# Patient Record
Sex: FEMALE | Race: WHITE | Employment: OTHER
[De-identification: names, ages, dates, MRNs, and addresses within clinical notes are randomized per-mention and may not be internally consistent; named-entity substitution may affect disease eponyms.]

---

## 2017-01-09 ENCOUNTER — HOSPITAL ENCOUNTER (OUTPATIENT)
Dept: PHYSICAL THERAPY | Age: 82
Setting detail: THERAPIES SERIES
Discharge: STILL ADMITTED OR ADMINISTRATIVE | End: 2017-01-09
Attending: PHYSICAL THERAPIST

## 2017-01-09 DIAGNOSIS — R29.3 POSTURAL INSTABILITY: ICD-10-CM

## 2017-01-09 DIAGNOSIS — R26.89 UNSTABLE BALANCE: ICD-10-CM

## 2017-01-09 DIAGNOSIS — R29.898 WEAKNESS OF BOTH LOWER EXTREMITIES: ICD-10-CM

## 2017-01-20 ENCOUNTER — HOSPITAL ENCOUNTER (OUTPATIENT)
Dept: PHYSICAL THERAPY | Age: 82
Setting detail: THERAPIES SERIES
Discharge: STILL ADMITTED OR ADMINISTRATIVE | End: 2017-01-20
Attending: PHYSICAL THERAPIST

## 2017-01-20 DIAGNOSIS — R29.3 POSTURAL INSTABILITY: ICD-10-CM

## 2017-01-20 DIAGNOSIS — R29.898 WEAKNESS OF BOTH LOWER EXTREMITIES: ICD-10-CM

## 2017-01-24 ENCOUNTER — HOSPITAL ENCOUNTER (OUTPATIENT)
Dept: PHYSICAL THERAPY | Age: 82
Setting detail: THERAPIES SERIES
Discharge: STILL ADMITTED OR ADMINISTRATIVE | End: 2017-01-24
Attending: PHYSICAL THERAPIST

## 2017-01-24 DIAGNOSIS — R29.898 WEAKNESS OF BOTH LOWER EXTREMITIES: ICD-10-CM

## 2017-01-24 DIAGNOSIS — R29.3 POSTURAL INSTABILITY: ICD-10-CM

## 2017-01-24 DIAGNOSIS — R26.89 UNSTABLE BALANCE: ICD-10-CM

## 2017-01-26 ENCOUNTER — HOSPITAL ENCOUNTER (OUTPATIENT)
Dept: PHYSICAL THERAPY | Age: 82
Setting detail: THERAPIES SERIES
Discharge: STILL ADMITTED OR ADMINISTRATIVE | End: 2017-01-26
Attending: PHYSICAL THERAPIST

## 2017-01-26 DIAGNOSIS — R26.89 UNSTABLE BALANCE: ICD-10-CM

## 2017-01-26 DIAGNOSIS — R29.3 POSTURAL INSTABILITY: ICD-10-CM

## 2017-01-26 DIAGNOSIS — R29.898 WEAKNESS OF BOTH LOWER EXTREMITIES: ICD-10-CM

## 2017-01-30 ENCOUNTER — HOSPITAL ENCOUNTER (OUTPATIENT)
Dept: PHYSICAL THERAPY | Age: 82
Setting detail: THERAPIES SERIES
Discharge: STILL ADMITTED OR ADMINISTRATIVE | End: 2017-01-30
Attending: PHYSICAL THERAPIST

## 2017-01-30 DIAGNOSIS — R29.3 POSTURAL INSTABILITY: ICD-10-CM

## 2017-01-30 DIAGNOSIS — R26.89 UNSTABLE BALANCE: ICD-10-CM

## 2017-01-30 DIAGNOSIS — R29.898 WEAKNESS OF BOTH LOWER EXTREMITIES: ICD-10-CM

## 2017-02-02 ENCOUNTER — HOSPITAL ENCOUNTER (OUTPATIENT)
Dept: PHYSICAL THERAPY | Age: 82
Setting detail: THERAPIES SERIES
Discharge: STILL ADMITTED OR ADMINISTRATIVE | End: 2017-02-02
Attending: PHYSICAL THERAPIST

## 2017-02-02 DIAGNOSIS — R26.89 UNSTABLE BALANCE: ICD-10-CM

## 2017-02-02 DIAGNOSIS — R29.898 WEAKNESS OF BOTH LOWER EXTREMITIES: ICD-10-CM

## 2017-02-02 DIAGNOSIS — R29.3 POSTURAL INSTABILITY: ICD-10-CM

## 2017-02-06 ENCOUNTER — HOSPITAL ENCOUNTER (OUTPATIENT)
Dept: PHYSICAL THERAPY | Age: 82
Setting detail: THERAPIES SERIES
Discharge: STILL ADMITTED OR ADMINISTRATIVE | End: 2017-02-06
Attending: PHYSICAL THERAPIST

## 2017-02-06 DIAGNOSIS — R29.3 POSTURAL INSTABILITY: ICD-10-CM

## 2017-02-06 DIAGNOSIS — R29.898 WEAKNESS OF BOTH LOWER EXTREMITIES: ICD-10-CM

## 2017-02-06 DIAGNOSIS — R26.89 UNSTABLE BALANCE: ICD-10-CM

## 2017-02-09 ENCOUNTER — HOSPITAL ENCOUNTER (OUTPATIENT)
Dept: PHYSICAL THERAPY | Age: 82
Setting detail: THERAPIES SERIES
Discharge: STILL ADMITTED OR ADMINISTRATIVE | End: 2017-02-09
Attending: PHYSICAL THERAPIST

## 2017-02-09 DIAGNOSIS — R29.898 WEAKNESS OF BOTH LOWER EXTREMITIES: ICD-10-CM

## 2017-02-09 DIAGNOSIS — R26.89 UNSTABLE BALANCE: ICD-10-CM

## 2017-02-09 DIAGNOSIS — R29.3 POSTURAL INSTABILITY: ICD-10-CM

## 2017-02-14 ENCOUNTER — HOSPITAL ENCOUNTER (OUTPATIENT)
Dept: PHYSICAL THERAPY | Age: 82
Setting detail: THERAPIES SERIES
Discharge: STILL ADMITTED OR ADMINISTRATIVE | End: 2017-02-14
Attending: PHYSICAL THERAPIST

## 2017-02-14 DIAGNOSIS — R29.3 POSTURAL INSTABILITY: ICD-10-CM

## 2017-02-14 DIAGNOSIS — R26.89 UNSTABLE BALANCE: ICD-10-CM

## 2017-02-14 DIAGNOSIS — R29.898 WEAKNESS OF BOTH LOWER EXTREMITIES: ICD-10-CM

## 2017-02-16 ENCOUNTER — HOSPITAL ENCOUNTER (OUTPATIENT)
Dept: PHYSICAL THERAPY | Age: 82
Setting detail: THERAPIES SERIES
Discharge: STILL ADMITTED OR ADMINISTRATIVE | End: 2017-02-16
Attending: PHYSICAL THERAPIST

## 2017-02-16 DIAGNOSIS — R26.89 UNSTABLE BALANCE: ICD-10-CM

## 2017-02-16 DIAGNOSIS — R29.898 WEAKNESS OF BOTH LOWER EXTREMITIES: ICD-10-CM

## 2017-02-16 DIAGNOSIS — R29.3 POSTURAL INSTABILITY: ICD-10-CM

## 2017-02-21 ENCOUNTER — HOSPITAL ENCOUNTER (OUTPATIENT)
Dept: CT IMAGING | Facility: CLINIC | Age: 82
Discharge: HOME | End: 2017-02-21
Attending: PREVENTIVE MEDICINE | Admitting: PREVENTIVE MEDICINE
Payer: MEDICARE

## 2017-02-21 DIAGNOSIS — Z00.6 RESEARCH EXAM: ICD-10-CM

## 2017-02-21 PROCEDURE — 71250 CT THORAX DX C-: CPT

## 2017-04-13 ENCOUNTER — OFFICE VISIT (OUTPATIENT)
Dept: NEUROLOGY | Facility: CLINIC | Age: 82
End: 2017-04-13

## 2017-04-13 VITALS — SYSTOLIC BLOOD PRESSURE: 110 MMHG | HEART RATE: 77 BPM | DIASTOLIC BLOOD PRESSURE: 64 MMHG | RESPIRATION RATE: 18 BRPM

## 2017-04-13 DIAGNOSIS — I95.9 HYPOTENSION, UNSPECIFIED HYPOTENSION TYPE: ICD-10-CM

## 2017-04-13 DIAGNOSIS — G20.A1 PARALYSIS AGITANS (H): Primary | ICD-10-CM

## 2017-04-13 ASSESSMENT — PAIN SCALES - GENERAL: PAINLEVEL: NO PAIN (0)

## 2017-04-13 NOTE — PROGRESS NOTES
"2017      Jn Carvalho MD   [facility]    [address] [place] [address]      RE: Olivia Dupont   MRN: [id number]   : 1929      Dear Shakeel:      I saw Olivia Dupont back.  She is accompanied by her daughter.  She is a patient I saw in December.  She has findings of idiopathic Parkinson disease.  She chose to go on no treatment.      She tells me she is feeling \"fine.\"  Generally, the right upper extremity tremor does not bother her except occasionally at night when she is trying to initiate sleep.  She feels a bit unsteady when she is walking but has had no falls.  She does report a brief sense of unsteadiness when she first gets up.      Examination when the nurse saw her revealed her blood pressure is 150/67 sitting and 110/64 when she stood.  I did recheck her orthostatic pressures and her systolic was 120 sitting and 104 when she stood.  She was not symptomatic with this.      The patient does have a reduced blink rate.  She has full extraocular motility.  She has a fairly persistent right upper extremity tremor.  I do not appreciate a tremor on the left.  She has increased tone and cogwheeling in the right arm and mild cogwheeling in the left arm.  She can get up out of a chair on her own.  She ambulates with a narrow base but a fairly good stride.  She turns in 2 steps and maintains her balance.  She has a reduction of her right arm swing with an associated tremor when she is ambulating.      IMPRESSION:   1.  Parkinson disease.   2.  Orthostatic blood pressure drops.      PLAN:  I again discussed a trial of Sinemet.  She declines.  She is concerned about side effects and it is possible it could exacerbate her orthostatic hypotension.      I did point out to the patient and her daughter that her blood pressure does drop when she stands and cautioned her to get up slowly.  She is on a diuretic and I have suggested following up with you to see if any " adjustment in her medications would be appropriate in view of the orthostatic blood pressure drop.      I have asked her to followup with me in 6 months.      Sincerely,      MD ANDI Noriega MD             D: 2017 09:21   T: 2017 14:33   MT: AKA      Name:     PEGGY FELTON   MRN:      [id number]        Account:      [id number]   :      1929           Service Date: 2017      Document: [id number]

## 2017-04-13 NOTE — LETTER
"2017       RE: Olivia Duopnt  [address] [facility] [address]     Dear Colleague,    Thank you for referring your patient, Olivia Dupont, to the [facility] at [facility]. Please see a copy of my visit note below.    2017      Jn Carvalho MD   [facility]    [address] [place] [address]      RE: Olivia Dupont   MRN: [id number]   : 1929      Dear Shakeel:      I saw Olivia Dupont back.  She is accompanied by her daughter.  She is a patient I saw in December.  She has findings of idiopathic Parkinson disease.  She chose to go on no treatment.      She tells me she is feeling \"fine.\"  Generally, the right upper extremity tremor does not bother her except occasionally at night when she is trying to initiate sleep.  She feels a bit unsteady when she is walking but has had no falls.  She does report a brief sense of unsteadiness when she first gets up.      Examination when the nurse saw her revealed her blood pressure is 150/67 sitting and 110/64 when she stood.  I did recheck her orthostatic pressures and her systolic was 120 sitting and 104 when she stood.  She was not symptomatic with this.      The patient does have a reduced blink rate.  She has full extraocular motility.  She has a fairly persistent right upper extremity tremor.  I do not appreciate a tremor on the left.  She has increased tone and cogwheeling in the right arm and mild cogwheeling in the left arm.  She can get up out of a chair on her own.  She ambulates with a narrow base but a fairly good stride.  She turns in 2 steps and maintains her balance.  She has a reduction of her right arm swing with an associated tremor when she is ambulating.      IMPRESSION:   1.  Parkinson disease.   2.  Orthostatic blood pressure drops.      PLAN:  I again discussed a trial of Sinemet.  She declines.  She " is concerned about side effects and it is possible it could exacerbate her orthostatic hypotension.      I did point out to the patient and her daughter that her blood pressure does drop when she stands and cautioned her to get up slowly.  She is on a diuretic and I have suggested following up with you to see if any adjustment in her medications would be appropriate in view of the orthostatic blood pressure drop.      I have asked her to followup with me in 6 months.      Sincerely,      Natanael Lobato MD                 D: 2017 09:21   T: 2017 14:33   MT: AKA      Name:     PEGGY FELTON   MRN:      [id number]        Account:      [id number]   :      1929           Service Date: 2017      Document: [id number]

## 2017-04-13 NOTE — MR AVS SNAPSHOT
After Visit Summary   2017    Olivia Dupont    MRN: [id number]           Patient Information     Date Of Birth          1929        Visit Information        Provider Department      2017 9:00 AM Natanael Lobato MD [facility]        Today's Diagnoses     Paralysis agitans (H)    -  1    Hypotension, unspecified hypotension type           Follow-ups after your visit        Follow-up notes from your care team     Discussed this visit Return in about 6 months (around 10/13/2017).      Who to contact     Please call your clinic at [phone] to:    Ask questions about your health    Make or cancel appointments    Discuss your medicines    Learn about your test results    Speak to your doctor   If you have compliments or concerns about an experience at your clinic, or if you wish to file a complaint, please contact [facility] Patient Relations at [phone] or email us at [email or web address]         Additional Information About Your Visit        MyChart Information     PowerPractical is an electronic gateway that provides easy, online access to your medical records. With PowerPractical, you can request a clinic appointment, read your test results, renew a prescription or communicate with your care team.     To sign up for HemoSheart visit the website at [email or web address]   You will be asked to enter the access code listed below, as well as some personal information. Please follow the directions to create your username and password.     Your access code is: [email or web address]  Expires: 2017  9:14 AM     Your access code will  in 90 days. If you need help or a new code, please contact your [facility] or call [phone] for assistance.        Care EveryWhere ID     This is your Care EveryWhere ID. This could be used by other organizations to access your [facility]  records  [id number]        Your Vitals Were     Pulse Respirations                77 18           Blood Pressure from Last 3 Encounters:   04/13/17 110/64   12/06/16 118/70    Weight from Last 3 Encounters:   No data found for Wt              Today, you had the following     No orders found for display       Primary Care Provider Office Phone # Fax #    Jn Carvalho [phone]       [facility] [address] [facility] [address]        Thank you!     Thank you for choosing [facility] NEUROLOGY CLINIC  for your care. Our goal is always to provide you with excellent care. Hearing back from our patients is one way we can continue to improve our services. Please take a few minutes to complete the written survey that you may receive in the mail after your visit with us. Thank you!             Your Updated Medication List - Protect others around you: Learn how to safely use, store and throw away your medicines at www.disposemymeds.org.          This list is accurate as of: 4/13/17  9:14 AM.  Always use your most recent med list.                   Brand Name Dispense Instructions for use    aspirin 325 MG tablet      Take 325 mg by mouth daily       atorvastatin 10 MG tablet    LIPITOR     Take 10 mg by mouth daily       calcium carbonate 600 MG tablet   Generic drug:  calcium carbonate      Take 600 mg by mouth daily       cod liver oil Caps capsule      Take 1 capsule by mouth daily       famotidine 10 MG tablet    PEPCID     Take 10 mg by mouth daily       triamterene-hydrochlorothiazide 37.5-25 MG per capsule    DYAZIDE     Take 1 capsule by mouth every morning

## 2017-09-08 ENCOUNTER — AMBULATORY - HEALTHEAST (OUTPATIENT)
Dept: NEUROLOGY | Facility: CLINIC | Age: 82
End: 2017-09-08

## 2017-09-08 DIAGNOSIS — G20.A1 PARKINSON'S DISEASE (H): ICD-10-CM

## 2017-09-11 ENCOUNTER — HOSPITAL ENCOUNTER (OUTPATIENT)
Dept: PHYSICAL THERAPY | Age: 82
Setting detail: THERAPIES SERIES
Discharge: STILL ADMITTED OR ADMINISTRATIVE | End: 2017-09-11
Attending: PHYSICAL THERAPIST

## 2017-09-11 ENCOUNTER — MEDICAL CORRESPONDENCE (OUTPATIENT)
Dept: HEALTH INFORMATION MANAGEMENT | Facility: CLINIC | Age: 82
End: 2017-09-11

## 2017-09-11 DIAGNOSIS — R29.3 POSTURAL INSTABILITY: ICD-10-CM

## 2017-09-11 DIAGNOSIS — R26.89 UNSTABLE BALANCE: ICD-10-CM

## 2017-09-11 DIAGNOSIS — R29.898 WEAKNESS OF BOTH LOWER EXTREMITIES: ICD-10-CM

## 2017-09-12 ENCOUNTER — CARE COORDINATION (OUTPATIENT)
Dept: NEUROLOGY | Facility: CLINIC | Age: 82
End: 2017-09-12

## 2017-09-12 NOTE — PROGRESS NOTES
Medication question  Call patient Received: Today       Jim Marquez, Janine Omalley RN       Phone Number: [phone]                     Patient called to say that Dr Lobato put her on Carbidopa-Levodopa and is wondering if she can still take her vitamins and  Aspirin. She can be reached today after 3pm at [phone]. Okay to leave a message      9/12/17:  Returned a call to [facility].  I do not see that Dr. Lobato has prescribed Carbidopa-Levodopa for the patient.  I did ask that she call us back to discuss.  I did mention that if she is taking carbidopa/levo that taking vitamins and or aspirin would be just fine.  I left our contact number for her to call us back.

## 2017-09-15 ENCOUNTER — CARE COORDINATION (OUTPATIENT)
Dept: NEUROLOGY | Facility: CLINIC | Age: 82
End: 2017-09-15

## 2017-09-15 NOTE — PROGRESS NOTES
Marika Lassiter, Marika Griffin, RN        Phone Number: [phone]                     9/13/17: left voicemail message for patient asking for a call back            Previous Messages       ----- Message -----      From: Natanael Lobato MD      Sent: 9/13/2017  10:49 AM        To: Marika Lassiter RN   Subject: RE: Medication question                           She could but the last time I saw her in April she decided not to take Sinemet and I did not prescribe it. Could you clarify with patient? Thanks [facility]   ----- Message -----      From: Marika Lassiter RN      Sent: 9/13/2017  10:37 AM        To: Natanael Lobato MD   Subject: FW: Medication question                           Patient can still take her vitamins and ASA while on sinemet... Correct?     ThanksChloe

## 2017-10-02 ENCOUNTER — HOSPITAL ENCOUNTER (OUTPATIENT)
Dept: PHYSICAL THERAPY | Age: 82
Setting detail: THERAPIES SERIES
Discharge: STILL ADMITTED OR ADMINISTRATIVE | End: 2017-10-02
Attending: PHYSICAL THERAPIST

## 2017-10-02 DIAGNOSIS — R29.898 WEAKNESS OF BOTH LOWER EXTREMITIES: ICD-10-CM

## 2017-10-02 DIAGNOSIS — R26.89 UNSTABLE BALANCE: ICD-10-CM

## 2017-10-02 DIAGNOSIS — R29.3 POSTURAL INSTABILITY: ICD-10-CM

## 2017-10-05 ENCOUNTER — HOSPITAL ENCOUNTER (OUTPATIENT)
Dept: PHYSICAL THERAPY | Age: 82
Setting detail: THERAPIES SERIES
Discharge: STILL ADMITTED OR ADMINISTRATIVE | End: 2017-10-05
Attending: PHYSICAL THERAPIST

## 2017-10-05 DIAGNOSIS — R26.89 UNSTABLE BALANCE: ICD-10-CM

## 2017-10-05 DIAGNOSIS — R29.898 WEAKNESS OF BOTH LOWER EXTREMITIES: ICD-10-CM

## 2017-10-05 DIAGNOSIS — R29.3 POSTURAL INSTABILITY: ICD-10-CM

## 2017-10-09 ENCOUNTER — HOSPITAL ENCOUNTER (OUTPATIENT)
Dept: PHYSICAL THERAPY | Age: 82
Setting detail: THERAPIES SERIES
Discharge: STILL ADMITTED OR ADMINISTRATIVE | End: 2017-10-09
Attending: PHYSICAL THERAPIST

## 2017-10-09 DIAGNOSIS — R26.89 UNSTABLE BALANCE: ICD-10-CM

## 2017-10-09 DIAGNOSIS — R29.898 WEAKNESS OF BOTH LOWER EXTREMITIES: ICD-10-CM

## 2017-10-09 DIAGNOSIS — R29.3 POSTURAL INSTABILITY: ICD-10-CM

## 2017-10-11 ENCOUNTER — HOSPITAL ENCOUNTER (OUTPATIENT)
Dept: PHYSICAL THERAPY | Age: 82
Setting detail: THERAPIES SERIES
Discharge: STILL ADMITTED OR ADMINISTRATIVE | End: 2017-10-11
Attending: PHYSICAL THERAPIST

## 2017-10-11 DIAGNOSIS — R29.3 POSTURAL INSTABILITY: ICD-10-CM

## 2017-10-11 DIAGNOSIS — R29.898 WEAKNESS OF BOTH LOWER EXTREMITIES: ICD-10-CM

## 2017-10-16 ENCOUNTER — HOSPITAL ENCOUNTER (OUTPATIENT)
Dept: PHYSICAL THERAPY | Age: 82
Setting detail: THERAPIES SERIES
Discharge: STILL ADMITTED OR ADMINISTRATIVE | End: 2017-10-16
Attending: PHYSICAL THERAPIST

## 2017-10-16 DIAGNOSIS — R29.3 POSTURAL INSTABILITY: ICD-10-CM

## 2017-10-16 DIAGNOSIS — R29.898 WEAKNESS OF BOTH LOWER EXTREMITIES: ICD-10-CM

## 2017-10-16 DIAGNOSIS — R26.89 UNSTABLE BALANCE: ICD-10-CM

## 2017-10-20 ENCOUNTER — HOSPITAL ENCOUNTER (OUTPATIENT)
Dept: PHYSICAL THERAPY | Age: 82
Setting detail: THERAPIES SERIES
Discharge: STILL ADMITTED OR ADMINISTRATIVE | End: 2017-10-20
Attending: PHYSICAL THERAPIST

## 2017-10-20 DIAGNOSIS — R26.89 UNSTABLE BALANCE: ICD-10-CM

## 2017-10-20 DIAGNOSIS — R29.3 POSTURAL INSTABILITY: ICD-10-CM

## 2017-10-20 DIAGNOSIS — R29.898 WEAKNESS OF BOTH LOWER EXTREMITIES: ICD-10-CM

## 2017-10-23 ENCOUNTER — HOSPITAL ENCOUNTER (OUTPATIENT)
Dept: PHYSICAL THERAPY | Age: 82
Setting detail: THERAPIES SERIES
Discharge: STILL ADMITTED OR ADMINISTRATIVE | End: 2017-10-23
Attending: PHYSICAL THERAPIST

## 2017-10-23 DIAGNOSIS — R29.898 WEAKNESS OF BOTH LOWER EXTREMITIES: ICD-10-CM

## 2017-10-23 DIAGNOSIS — R29.3 POSTURAL INSTABILITY: ICD-10-CM

## 2017-10-25 ENCOUNTER — HOSPITAL ENCOUNTER (OUTPATIENT)
Dept: PHYSICAL THERAPY | Age: 82
Setting detail: THERAPIES SERIES
Discharge: STILL ADMITTED OR ADMINISTRATIVE | End: 2017-10-25
Attending: PHYSICAL THERAPIST

## 2017-10-25 DIAGNOSIS — R29.3 POSTURAL INSTABILITY: ICD-10-CM

## 2017-10-30 ENCOUNTER — HOSPITAL ENCOUNTER (OUTPATIENT)
Dept: PHYSICAL THERAPY | Age: 82
Setting detail: THERAPIES SERIES
Discharge: STILL ADMITTED OR ADMINISTRATIVE | End: 2017-10-30
Attending: PHYSICAL THERAPIST

## 2017-10-30 DIAGNOSIS — R29.898 WEAKNESS OF BOTH LOWER EXTREMITIES: ICD-10-CM

## 2017-10-30 DIAGNOSIS — R26.89 UNSTABLE BALANCE: ICD-10-CM

## 2017-10-30 DIAGNOSIS — R29.3 POSTURAL INSTABILITY: ICD-10-CM

## 2017-11-03 ENCOUNTER — HOSPITAL ENCOUNTER (OUTPATIENT)
Dept: PHYSICAL THERAPY | Age: 82
Setting detail: THERAPIES SERIES
Discharge: STILL ADMITTED OR ADMINISTRATIVE | End: 2017-11-03
Attending: PHYSICAL THERAPIST

## 2017-11-03 DIAGNOSIS — R29.3 POSTURAL INSTABILITY: ICD-10-CM

## 2017-11-03 DIAGNOSIS — R29.898 WEAKNESS OF BOTH LOWER EXTREMITIES: ICD-10-CM

## 2017-11-03 DIAGNOSIS — R26.89 UNSTABLE BALANCE: ICD-10-CM

## 2017-11-13 ENCOUNTER — OFFICE VISIT (OUTPATIENT)
Dept: NEUROLOGY | Facility: CLINIC | Age: 82
End: 2017-11-13

## 2017-11-13 VITALS
DIASTOLIC BLOOD PRESSURE: 50 MMHG | WEIGHT: 153.3 LBS | BODY MASS INDEX: 27.16 KG/M2 | OXYGEN SATURATION: 97 % | SYSTOLIC BLOOD PRESSURE: 122 MMHG | RESPIRATION RATE: 18 BRPM | HEIGHT: 63 IN | HEART RATE: 74 BPM

## 2017-11-13 DIAGNOSIS — G43.109 MIGRAINE EQUIVALENT SYNDROME: ICD-10-CM

## 2017-11-13 DIAGNOSIS — G20.A1 PARALYSIS AGITANS (H): Primary | ICD-10-CM

## 2017-11-13 RX ORDER — CARBIDOPA/LEVODOPA 10MG-100MG
0.5 TABLET ORAL 3 TIMES DAILY
COMMUNITY
End: 2018-02-12

## 2017-11-13 ASSESSMENT — PAIN SCALES - GENERAL: PAINLEVEL: MILD PAIN (3)

## 2017-11-13 NOTE — LETTER
2017        RE: Olivia Dupont  [address] [facility] [address]     Dear Colleague,    Thank you for referring your patient, Olivia Dupont, to the [facility] NEUROLOGY at [facility]. Please see a copy of my visit note below.    2017      Jn Carvalho MD   [facility]    [address] [address]      RE: Olivia Dupont   MRN: [id number]   : 1929      Dear Shakeel:      I saw Olivia Dupont back.  She is a patient with Parkinson disease.      Since I saw her in April, she met with you and decided to try Sinemet.  She is currently taking 1/2 of a 10/100 Sinemet tablet 3 times a day.  Her daughter thinks it has helped her tremor, although the patient is less clear.  Apparently she developed a rash when she initially started on the drug, but that has since cleared.      She also reports an episode a month ago when she was at a [employer] service.  She developed wiggly, wavy lines in her visual field bilaterally.  She had trouble understanding the text she was reading, but she was fully alert.  She was not weak.  She did not get a headache.  This all lasted 5-10 minutes.  She does tell me she has had about a dozen migraine type headaches in her life and those characteristically were accompanied by a visual disturbance like she had with the event a month ago.      Examination reveals she is alert and cooperative.  Heart rate 91.  Blood pressure 136/79 sitting and 132/65 when she stands.      Cranial nerves II-XII are intact.  Visual fields are intact.  There is no focal motor or sensory deficit.  Reflexes are symmetric.  Plantar responses are flexor.      She has an intermittent but fairly persistent tremor at rest of the right upper extremity.  She has cogwheeling in the right arm.  She has a reduction of her right arm swing when she ambulates.  She turns in 2 steps.      She does have swelling of her  ankles.      IMPRESSION:   1.  Parkinson disease.   2.  Probable migraine event 1 month ago.      PLAN:  She is concerned about edema that has developed coincident with going on Sinemet.  I told her that I think this would be an unusual side effect.  We discussed the pros and cons of continuing the Sinemet.  Actually if we were going to do so, I would likely recommend trying to increase the dose.      She decided that she would just like to come off the Sinemet for now.  She is going to taper off over the next 10 days and I gave her a schedule to do so.  She could always resume it in the future.      I discussed the episode she had a month ago.  I suspect it was migrainous in nature, but told her if she has recurrent episodes such as this she would deserve further evaluation.  I told her if she has any episodes with acute changes in her level of consciousness, strength, speech, etc., that this would necessitate emergency evaluation.      I discussed followup with me.  I would be happy to see her on an as-needed basis.  She would actually prefer to see me back in 3 months and so this was scheduled.      Sincerely,       Natanael Lobato MD                    D: 2017 14:41   T: 2017 15:17   MT: AKA      Name:     PEGGY FELTON   MRN:      [id number]        Account:      [id number]   :      1929           Service Date: 2017      Document: [id number]

## 2017-11-13 NOTE — MR AVS SNAPSHOT
After Visit Summary   11/13/2017    Olivia Dupont    MRN: [id number]           Patient Information     Date Of Birth          1/31/1929        Visit Information        Provider Department      11/13/2017 2:00 PM Natanael oLbato MD [facility]        Today's Diagnoses     Paralysis agitans (H)    -  1    Migraine equivalent syndrome          Care Instructions    OK to try off Sinemet. Reduce to 1/2 tablet twice a day for 5 days, then 1/2 tablet a day for 5 days then may STOP          Follow-ups after your visit        Follow-up notes from your care team     Discussed this visit Return in about 3 months (around 2/13/2018), or if symptoms worsen or fail to improve.      Your next 10 appointments already scheduled     Feb 05, 2018  2:00 PM CST   (Arrive by 1:45 PM)   Return Visit with Natanael Lobato MD   [facility] ([facility])    [address] [place] [id number] [phone]              Who to contact     Please call your clinic at [phone] to:    Ask questions about your health    Make or cancel appointments    Discuss your medicines    Learn about your test results    Speak to your doctor   If you have compliments or concerns about an experience at your clinic, or if you wish to file a complaint, please contact [facility] Patient Relations at [phone] or email us at [email or web address]         Additional Information About Your Visit        MyChart Information     EndoChoicet is an electronic gateway that provides easy, online access to your medical records. With fluid Operations, you can request a clinic appointment, read your test results, renew a prescription or communicate with your care team.     To sign up for EndoChoicet visit the website at [email or web address]   You will be asked to enter the access code listed below, as well as some personal information. Please follow the directions  "to create your username and password.     Your access code is: [email or web address]  Expires: 2018  5:30 AM     Your access code will  in 90 days. If you need help or a new code, please contact your [facility] or call [phone] for assistance.        Care EveryWhere ID     This is your Care EveryWhere ID. This could be used by other organizations to access your [place] medical records  [id number]        Your Vitals Were     Pulse Respirations Height Pulse Oximetry BMI (Body Mass Index)       74 18 1.6 m (5' 3\") 97% 27.16 kg/m2        Blood Pressure from Last 3 Encounters:   17 122/50   17 110/64   16 118/70    Weight from Last 3 Encounters:   17 69.5 kg (153 lb 4.8 oz)              Today, you had the following     No orders found for display       Primary Care Provider Office Phone # Fax #    Jn Carvalho [phone]       [address] [address]        Equal Access to Services     [facility]: Hadii aad ku hadasho Soomaali, waaxda luqadaha, qaybta kaalmada adeegyada, waxay idiin hayaan meera craft . So [facility] [phone].    ATENCIÓN: Si habla español, tiene a camilo disposición servicios gratuitos de asistencia lingüística. Llame al [phone].    We comply with applicable federal civil rights laws and [place] laws. We do not discriminate on the basis of race, color, national origin, age, disability, sex, sexual orientation, or gender identity.            Thank you!     Thank you for choosing [facility] NEUROLOGY  for your care. Our goal is always to provide you with excellent care. Hearing back from our patients is one way we can continue to improve our services. Please take a few minutes to complete the written survey that you may receive in the mail after your visit with us. Thank you!             Your Updated Medication List - Protect others around you: Learn " how to safely use, store and throw away your medicines at www.disposemymeds.org.          This list is accurate as of: 11/13/17  2:37 PM.  Always use your most recent med list.                   Brand Name Dispense Instructions for use Diagnosis    aspirin 325 MG tablet      Take 325 mg by mouth daily        calcium carbonate 600 MG tablet   Generic drug:  calcium carbonate      Take 600 mg by mouth daily        carbidopa-levodopa  MG per tablet    SINEMET     Take 0.5 tablets by mouth 3 times daily        cod liver oil Caps capsule      Take 1 capsule by mouth daily        famotidine 10 MG tablet    PEPCID     Take 10 mg by mouth daily        LASIX PO      Take by mouth daily

## 2017-11-13 NOTE — PROGRESS NOTES
2017      Jn Carvalho MD   [facility]    [address] [place] [address]      RE: Olivia Dupont   MRN: [id number]   : 1929      Dear Shakeel:      I saw Olivia Dupont back.  She is a patient with Parkinson disease.      Since I saw her in April, she met with you and decided to try Sinemet.  She is currently taking 1/2 of a 10/100 Sinemet tablet 3 times a day.  Her daughter thinks it has helped her tremor, although the patient is less clear.  Apparently she developed a rash when she initially started on the drug, but that has since cleared.      She also reports an episode a month ago when she was at a [employer] service.  She developed wiggly, wavy lines in her visual field bilaterally.  She had trouble understanding the text she was reading, but she was fully alert.  She was not weak.  She did not get a headache.  This all lasted 5-10 minutes.  She does tell me she has had about a dozen migraine type headaches in her life and those characteristically were accompanied by a visual disturbance like she had with the event a month ago.      Examination reveals she is alert and cooperative.  Heart rate 91.  Blood pressure 136/79 sitting and 132/65 when she stands.      Cranial nerves II-XII are intact.  Visual fields are intact.  There is no focal motor or sensory deficit.  Reflexes are symmetric.  Plantar responses are flexor.      She has an intermittent but fairly persistent tremor at rest of the right upper extremity.  She has cogwheeling in the right arm.  She has a reduction of her right arm swing when she ambulates.  She turns in 2 steps.      She does have swelling of her ankles.      IMPRESSION:   1.  Parkinson disease.   2.  Probable migraine event 1 month ago.      PLAN:  She is concerned about edema that has developed coincident with going on Sinemet.  I told her that I think this would be an unusual side effect.  We discussed the pros and cons of  continuing the Sinemet.  Actually if we were going to do so, I would likely recommend trying to increase the dose.      She decided that she would just like to come off the Sinemet for now.  She is going to taper off over the next 10 days and I gave her a schedule to do so.  She could always resume it in the future.      I discussed the episode she had a month ago.  I suspect it was migrainous in nature, but told her if she has recurrent episodes such as this she would deserve further evaluation.  I told her if she has any episodes with acute changes in her level of consciousness, strength, speech, etc., that this would necessitate emergency evaluation.      I discussed followup with me.  I would be happy to see her on an as-needed basis.  She would actually prefer to see me back in 3 months and so this was scheduled.      Sincerely,       MD ANDI Noriega MD             D: 2017 14:41   T: 2017 15:17   MT: AKA      Name:     PEGGY FELTON   MRN:      [id number]        Account:      [id number]   :      1929           Service Date: 2017      Document: [id number]

## 2017-11-13 NOTE — PATIENT INSTRUCTIONS
OK to try off Sinemet. Reduce to 1/2 tablet twice a day for 5 days, then 1/2 tablet a day for 5 days then may STOP

## 2018-02-12 ENCOUNTER — OFFICE VISIT (OUTPATIENT)
Dept: NEUROLOGY | Facility: CLINIC | Age: 83
End: 2018-02-12
Payer: COMMERCIAL

## 2018-02-12 VITALS
DIASTOLIC BLOOD PRESSURE: 81 MMHG | SYSTOLIC BLOOD PRESSURE: 166 MMHG | HEART RATE: 75 BPM | HEIGHT: 63 IN | WEIGHT: 148 LBS | BODY MASS INDEX: 26.22 KG/M2

## 2018-02-12 DIAGNOSIS — G20.A1 PARALYSIS AGITANS (H): Primary | ICD-10-CM

## 2018-02-12 DIAGNOSIS — H53.2 DIPLOPIA: ICD-10-CM

## 2018-02-12 RX ORDER — TRIAMTERENE AND HYDROCHLOROTHIAZIDE 37.5; 25 MG/1; MG/1
1 CAPSULE ORAL
COMMUNITY
Start: 2017-11-22 | End: 2018-12-31

## 2018-02-12 ASSESSMENT — PAIN SCALES - GENERAL: PAINLEVEL: NO PAIN (0)

## 2018-02-12 NOTE — MR AVS SNAPSHOT
"              After Visit Summary   2018    Olivia Dupont    MRN: [id number]           Patient Information     Date Of Birth          1929        Visit Information        Provider Department      2018 9:00 AM Natanael Lobato MD [facility]        Today's Diagnoses     Paralysis agitans (H)    -  1    Diplopia           Follow-ups after your visit        Follow-up notes from your care team     Discussed this visit Return if symptoms worsen or fail to improve.      Who to contact     Please call your clinic at [phone] to:    Ask questions about your health    Make or cancel appointments    Discuss your medicines    Learn about your test results    Speak to your doctor            Additional Information About Your Visit        MyChart Information     [employer] is an electronic gateway that provides easy, online access to your medical records. With [employer]-light[employer], you can request a clinic appointment, read your test results, renew a prescription or communicate with your care team.     To sign up for [employer] visit the website at [email or web address]   You will be asked to enter the access code listed below, as well as some personal information. Please follow the directions to create your username and password.     Your access code is: [email or web address]  Expires: 2018  6:30 AM     Your access code will  in 90 days. If you need help or a new code, please contact your [facility] or call [phone] for assistance.        Care EveryWhere ID     This is your Care EveryWhere ID. This could be used by other organizations to access your [place] medical records  [id number]        Your Vitals Were     Pulse Height BMI (Body Mass Index)             75 1.6 m (5' 3\") 26.22 kg/m2          Blood Pressure from Last 3 Encounters:   18 166/81   17 122/50   17 110/64    Weight from Last 3 Encounters:   18 67.1 kg (148 lb)   17 69.5 kg (153 " lb 4.8 oz)              Today, you had the following     No orders found for display         Today's Medication Changes          These changes are accurate as of 2/12/18  9:26 AM.  If you have any questions, ask your nurse or doctor.               Stop taking these medicines if you haven't already. Please contact your care team if you have questions.     LASIX PO   Stopped by:  Natanael Lobato MD                    Primary Care Provider Office Phone # Fax #    Jn Carvalho [phone]       [facility] [address] [place] [address]        Equal Access to Services     [facility]: Hadii aad ku hadasho Soomaali, waaxda luqadaha, qaybta kaalmada adeegyada, zechariah craft . So [facility] [phone].    ATENCIÓN: Si habla español, tiene a camilo disposición servicios gratuitos de asistencia lingüística. [facility] [phone].    We comply with applicable federal civil rights laws and [place] laws. We do not discriminate on the basis of race, color, national origin, age, disability, sex, sexual orientation, or gender identity.            Thank you!     Thank you for choosing [facility] NEUROLOGY  for your care. Our goal is always to provide you with excellent care. Hearing back from our patients is one way we can continue to improve our services. Please take a few minutes to complete the written survey that you may receive in the mail after your visit with us. Thank you!             Your Updated Medication List - Protect others around you: Learn how to safely use, store and throw away your medicines at www.disposemymeds.org.          This list is accurate as of 2/12/18  9:26 AM.  Always use your most recent med list.                   Brand Name Dispense Instructions for use Diagnosis    aspirin 325 MG tablet      Take 325 mg by mouth daily        calcium carbonate 600 MG tablet   Generic drug:  calcium carbonate      Take 600 mg by mouth  daily        cod liver oil Caps capsule      Take 1 capsule by mouth daily        famotidine 10 MG tablet    PEPCID     Take 10 mg by mouth daily        triamterene-hydrochlorothiazide 37.5-25 MG per capsule    DYAZIDE     Take 1 capsule by mouth

## 2018-02-12 NOTE — PROGRESS NOTES
Service Date: 2018      Jn Carvalho MD   [facility]   [address] [place] [address]       RE: Olivia Dupont   MRN: [id number]   : 1929      Dear Shakeel:      I saw Olivia Dupont back.  She is accompanied by her daughter.      As you know, she has Parkinson disease.  When I saw her in November she decided to come off the low dose Sinemet she was taking (1/2 of a 10/100 tablet 3 times a day).  She was concerned because she developed edema on the drug.  She also may have developed a rash when she first started on it, but it cleared.      She feels she is doing about the same.  She does not want go back on the Sinemet.      When I saw her in 2017, she reported what I felt likely represented a migrainous visual disturbance.  This has not recurred.      Today, she reports she is having more frequent diplopia.  This is a problem we had not previously discussed.  She has been experiencing this for many years.  She may have in fact had prism lenses at one point, by her description.  She only notices double vision with extreme lateral gaze to the right or the left.  It is a horizontal separation.  She does not report any fatiguing ptosis or limb weakness.  She has no bulbar symptoms.  I should note that she did have a brain MRI scan done in 2016 and it showed some mild small vessel changes and atrophy but otherwise was unremarkable.      Examination reveals she is alert and cooperative.  She has slightly hypophonic speech today.  Heart rate 75.  Blood pressure initially was 166/81 but when it was rechecked it was 147/66.      She has a narrowing of her left palpebral fissure but no fatiguing ptosis.  She has a slight left esotropia, but full extraocular motility.  Her facial strength and limb strength are normal.  She has persistent rest tremor of the right upper extremity.  She has bilateral upper extremity cogwheeling.  She rocks once to get up  out of a chair, but can do so independently.  She ambulates with a narrow base with a reduction of her right arm swing and a slight tremor of the right hand.  She turns cautiously in 3 steps.      IMPRESSION:   1.  Parkinson disease which appears stable.   2.  Chronic diplopia.      PLAN:  As noted above, she does not want to retry Sinemet.      I discussed her diplopia.  I offered her referral to the Neuro-Ophthalmology for further evaluation, but she declined.  She will consider this if worsens.      At this juncture, followup with me will be as needed.      Sincerely,       MD ANDI Noriega MD             D: 2018   T: 2018   MT: AKA      Name:     PEGGY FELTON   MRN:      [id number]        Account:      [id number]   :      1929           Service Date: 2018      Document: [id number]

## 2018-02-12 NOTE — LETTER
2018       RE: Olivia Dupont  [address] [facility] [address] [address]     Dear Colleague,    Thank you for referring your patient, Olviia Dupont, to the [facility] NEUROLOGY at [facility]. Please see a copy of my visit note below.    Service Date: 2018      Jn Carvalho MD   [facility]   [address] [place] [address]       RE: Olivia Dupont   MRN: [id number]   : 1929      Dear Shakeel:      I saw Olivia Dupont back.  She is accompanied by her daughter.      As you know, she has Parkinson disease.  When I saw her in November she decided to come off the low dose Sinemet she was taking (1/2 of a 10/100 tablet 3 times a day).  She was concerned because she developed edema on the drug.  She also may have developed a rash when she first started on it, but it cleared.      She feels she is doing about the same.  She does not want go back on the Sinemet.      When I saw her in 2017, she reported what I felt likely represented a migrainous visual disturbance.  This has not recurred.      Today, she reports she is having more frequent diplopia.  This is a problem we had not previously discussed.  She has been experiencing this for many years.  She may have in fact had prism lenses at one point, by her description.  She only notices double vision with extreme lateral gaze to the right or the left.  It is a horizontal separation.  She does not report any fatiguing ptosis or limb weakness.  She has no bulbar symptoms.  I should note that she did have a brain MRI scan done in 2016 and it showed some mild small vessel changes and atrophy but otherwise was unremarkable.      Examination reveals she is alert and cooperative.  She has slightly hypophonic speech today.  Heart rate 75.  Blood pressure initially was 166/81 but when it was rechecked it was 147/66.      She has a narrowing of her left palpebral  fissure but no fatiguing ptosis.  She has a slight left esotropia, but full extraocular motility.  Her facial strength and limb strength are normal.  She has persistent rest tremor of the right upper extremity.  She has bilateral upper extremity cogwheeling.  She rocks once to get up out of a chair, but can do so independently.  She ambulates with a narrow base with a reduction of her right arm swing and a slight tremor of the right hand.  She turns cautiously in 3 steps.      IMPRESSION:   1.  Parkinson disease which appears stable.   2.  Chronic diplopia.      PLAN:  As noted above, she does not want to retry Sinemet.      I discussed her diplopia.  I offered her referral to the Neuro-Ophthalmology for further evaluation, but she declined.  She will consider this if worsens.      At this juncture, followup with me will be as needed.      Sincerely,       Natanael Lobato MD        D: 2018   T: 2018   MT: AKA   Name:     PEGGY FELTON   MRN:      [id number]        Account:      [id number]   :      1929           Service Date: 2018   Document: [id number]

## 2018-04-13 ENCOUNTER — DOCUMENTATION ONLY (OUTPATIENT)
Dept: NEUROLOGY | Facility: CLINIC | Age: 83
End: 2018-04-13

## 2018-04-13 NOTE — PROGRESS NOTES
Parkinson's Disease - Physician Questionnaire Cancer Prevention Study - Parkinson's, was completed and signed by Dr. Lobato. Mailed out today. Copy/upload sent to [employer].

## 2018-08-31 ENCOUNTER — CARE COORDINATION (OUTPATIENT)
Dept: NEUROLOGY | Facility: CLINIC | Age: 83
End: 2018-08-31

## 2018-08-31 NOTE — PROGRESS NOTES
Mailed out today at 1606 on 8/31/2018 to Cancer Prevention Study - Tru Schuler MD, PH   [employer]  [address] [place] [id number]    Sent all the office visit notes requested and approved by patient.

## 2018-12-27 ENCOUNTER — TRANSFERRED RECORDS (OUTPATIENT)
Dept: HEALTH INFORMATION MANAGEMENT | Facility: CLINIC | Age: 83
End: 2018-12-27

## 2018-12-31 ENCOUNTER — OFFICE VISIT (OUTPATIENT)
Dept: NEUROLOGY | Facility: CLINIC | Age: 83
End: 2018-12-31
Payer: COMMERCIAL

## 2018-12-31 VITALS — OXYGEN SATURATION: 98 % | WEIGHT: 147 LBS | BODY MASS INDEX: 26.04 KG/M2

## 2018-12-31 DIAGNOSIS — R47.89 SPELLS OF SPEECH ARREST: Primary | ICD-10-CM

## 2018-12-31 DIAGNOSIS — G20.A1 PARKINSON DISEASE (H): ICD-10-CM

## 2018-12-31 DIAGNOSIS — G45.9 TIA (TRANSIENT ISCHEMIC ATTACK): ICD-10-CM

## 2018-12-31 RX ORDER — FLUDROCORTISONE ACETATE 0.1 MG/1
0.1 TABLET ORAL DAILY
COMMUNITY
Start: 2018-10-08 | End: 2019-06-19

## 2018-12-31 RX ORDER — CARBIDOPA/LEVODOPA 10MG-100MG
0.5 TABLET ORAL 3 TIMES DAILY
COMMUNITY
Start: 2018-11-21 | End: 2019-02-18 | Stop reason: DRUGHIGH

## 2018-12-31 ASSESSMENT — PAIN SCALES - GENERAL: PAINLEVEL: NO PAIN (0)

## 2018-12-31 NOTE — LETTER
12/31/2018       RE: Olivia Dupont  [address] [facility] [address]     Dear Colleague,    Thank you for referring your patient, Olivia Dupont, to the [facility]. Please see a copy of my visit note below.    Service Date: 12/31/2018      PRIMARY CARE PHYSICIAN:  Jn Carvalho MD      HISTORY OF PRESENT ILLNESS:  I saw Olivia Dupont back.  She is accompanied by her daughters.  She is a patient with Parkinson disease.  She is here for a new problem.      Concerning her Parkinson's disease, she did restart Sinemet.  She is taking one-half of a 10/100 tablet 3 times a day and she does think it has helped her tremor.  I also note that you added a low dose of Florinef (0.1 mg daily) and she is not having any orthostatic symptoms.      Today we discussed spells that have been occurring since around September.  She has had 4 or 5 of these.  The most severe one occurred on 12/23.  All her spells are fairly the same.  She will be almost like she is frozen.  She cannot respond.  She does have full recollection of everything, but just cannot respond verbally.  Her daughter on one occasion said she was drooling.  She recalls one of the episodes, she developed a numbness in her right hand that spread up her arm and then she was unable to express herself.  One episode on 12/23 lasted up to 2 hours.  She had 1 the next day while eating lunch and this one lasted about 10 minutes.  She had a glazed look in her eye and she was unable to speak.  She was able to walk.  She has had no motor weakness.  Her daughter indicates on one occasion, it seemed her right arm shook more than it typically does during one of the spells.      She was started on aspirin.      CURRENT MEDICATIONS:   1.  Aspirin 81 mg.   2.  Sinemet 10/100, one-half tablet 3 times a day.   3.  Florinef 0.1 mg a day.      PHYSICAL EXAMINATION:  Examination today reveals her heart rate is 70 and  regular with a blood pressure of 147/69 sitting.  Standing, her blood pressure is 134/54 with a pulse of 86.     Cervical bruit not appreciated.     Her speech is soft but clear.  Pupils are equal, round, react well to light.  Visual fields are intact.  Cranial nerves II-XII are intact.  Motor examination reveals intact upper and lower extremity strength.  On sensory testing, she has intact position sense and does not extinguish double simultaneous sensory stimuli.  She has a fairly persistent rest tremor of the right upper extremity with increased tone.  She can get up and ambulate independently for a short distance, but does use a rolling walker.  Reflexes are 2+ in the upper extremities, 2+ at the knees and absent at the ankles.  Plantar responses are flexor.      IMPRESSION:   1.  Spells with inability to speak.   2.  Parkinson's disease.      PLAN:  As a first step, I do want to get a brain MRI scan of the neck and intracranial MR angiograms to make certain we are not dealing with a vascular stenosis on the left, recent stroke, or other left hemisphere lesion.  I am going to try and get that within the next couple of days.      If the MRI studies are unrevealing, then the next step would be to do an EEG.      She should continue with aspirin.      I did tell her daughters that if she has any spells that are associated with weakness or altered level of consciousness that she should be seen immediately and in that case to call 911.      I am going to be contacting her daughter, Isabel, who lives with Ms. Dupont when I have the results of the MRI scan later this week.     ADDENDUM 1/3/19: MRI, MRAS reviewed. No evidence of recent stroke. No cortical lesion. Some atrophy and small vessel changes. Neck and intracranial vessels good. No stenosis. Discussed with patient and daughter Isabel. Suggested next step is 3 hour EEG as spells could be partial seizures. For now, patient will consider and get back to me when reaches  a decision.     D: 2018   T: 2018   MT: al   Name:     PEGGY FELTON   MRN:      [id number]        Account:      [id number]   :      1929           Service Date: 2018   Document: [id number]      Natanael Lobato MD       cc:   Jn Carvalho MD   [facility]   [address] [place] [address]

## 2019-01-01 NOTE — PROGRESS NOTES
Service Date: 12/31/2018      PRIMARY CARE PHYSICIAN:  Jn Carvalho MD      HISTORY OF PRESENT ILLNESS:  I saw Olivia Dupont back.  She is accompanied by her daughters.  She is a patient with Parkinson disease.  She is here for a new problem.      Concerning her Parkinson's disease, she did restart Sinemet.  She is taking one-half of a 10/100 tablet 3 times a day and she does think it has helped her tremor.  I also note that you added a low dose of Florinef (0.1 mg daily) and she is not having any orthostatic symptoms.      Today we discussed spells that have been occurring since around September.  She has had 4 or 5 of these.  The most severe one occurred on 12/23.  All her spells are fairly the same.  She will be almost like she is frozen.  She cannot respond.  She does have full recollection of everything, but just cannot respond verbally.  Her daughter on one occasion said she was drooling.  She recalls one of the episodes, she developed a numbness in her right hand that spread up her arm and then she was unable to express herself.  One episode on 12/23 lasted up to 2 hours.  She had 1 the next day while eating lunch and this one lasted about 10 minutes.  She had a glazed look in her eye and she was unable to speak.  She was able to walk.  She has had no motor weakness.  Her daughter indicates on one occasion, it seemed her right arm shook more than it typically does during one of the spells.      She was started on aspirin.      CURRENT MEDICATIONS:   1.  Aspirin 81 mg.   2.  Sinemet 10/100, one-half tablet 3 times a day.   3.  Florinef 0.1 mg a day.      PHYSICAL EXAMINATION:  Examination today reveals her heart rate is 70 and regular with a blood pressure of 147/69 sitting.  Standing, her blood pressure is 134/54 with a pulse of 86.     Cervical bruit not appreciated.     Her speech is soft but clear.  Pupils are equal, round, react well to light.  Visual fields are intact.  Cranial nerves II-XII are  intact.  Motor examination reveals intact upper and lower extremity strength.  On sensory testing, she has intact position sense and does not extinguish double simultaneous sensory stimuli.  She has a fairly persistent rest tremor of the right upper extremity with increased tone.  She can get up and ambulate independently for a short distance, but does use a rolling walker.  Reflexes are 2+ in the upper extremities, 2+ at the knees and absent at the ankles.  Plantar responses are flexor.      IMPRESSION:   1.  Spells with inability to speak.   2.  Parkinson's disease.      PLAN:  As a first step, I do want to get a brain MRI scan of the neck and intracranial MR angiograms to make certain we are not dealing with a vascular stenosis on the left, recent stroke, or other left hemisphere lesion.  I am going to try and get that within the next couple of days.      If the MRI studies are unrevealing, then the next step would be to do an EEG.      She should continue with aspirin.      I did tell her daughters that if she has any spells that are associated with weakness or altered level of consciousness that she should be seen immediately and in that case to call 911.      I am going to be contacting her daughter, Isabel, who lives with Ms. Dupont when I have the results of the MRI scan later this week.     ADDENDUM 1/3/19: MRI, MRAS reviewed. No evidence of recent stroke. No cortical lesion. Some atrophy and small vessel changes. Neck and intracranial vessels good. No stenosis. Discussed with patient and daughter Isabel. Suggested next step is 3 hour EEG as spells could be partial seizures. For now, patient will consider and get back to me when reaches a decision.      1/11/19: Patient decided to pursue EEG. 3 Hour study ordered     Natanael Lobato MD      cc:   Jn Carvalho MD   [facility]   [address] [place] [address]         NATANAEL LOBATO MD             D: 12/31/2018   T:  2018   MT: al      Name:     PEGGY FELTON   MRN:      [id number]        Account:      [id number]   :      1929           Service Date: 2018      Document: [id number]

## 2019-01-03 ENCOUNTER — HOSPITAL ENCOUNTER (OUTPATIENT)
Dept: MRI IMAGING | Facility: CLINIC | Age: 84
Discharge: HOME | End: 2019-01-03
Attending: PSYCHIATRY & NEUROLOGY | Admitting: PSYCHIATRY & NEUROLOGY
Payer: COMMERCIAL

## 2019-01-03 DIAGNOSIS — R47.89 SPELLS OF SPEECH ARREST: ICD-10-CM

## 2019-01-03 DIAGNOSIS — G45.9 TIA (TRANSIENT ISCHEMIC ATTACK): ICD-10-CM

## 2019-01-03 LAB
CREAT BLD-MCNC: 1.3 MG/DL (ref 0.52–1.04)
GFR SERPL CREATININE-BSD FRML MDRD: 39 ML/MIN/{1.73_M2}

## 2019-01-03 PROCEDURE — 82565 ASSAY OF CREATININE: CPT

## 2019-01-03 PROCEDURE — 25500064 ZZH RX 255 OP 636: Performed by: PSYCHIATRY & NEUROLOGY

## 2019-01-03 PROCEDURE — A9585 GADOBUTROL INJECTION: HCPCS | Performed by: PSYCHIATRY & NEUROLOGY

## 2019-01-03 PROCEDURE — 70549 MR ANGIOGRAPH NECK W/O&W/DYE: CPT

## 2019-01-03 RX ORDER — GADOBUTROL 604.72 MG/ML
7.5 INJECTION INTRAVENOUS ONCE
Status: COMPLETED | OUTPATIENT
Start: 2019-01-03 | End: 2019-01-03

## 2019-01-03 RX ADMIN — GADOBUTROL 7 ML: 604.72 INJECTION INTRAVENOUS at 15:49

## 2019-01-10 ENCOUNTER — TELEPHONE (OUTPATIENT)
Dept: NEUROLOGY | Facility: CLINIC | Age: 84
End: 2019-01-10
Payer: COMMERCIAL

## 2019-01-10 NOTE — TELEPHONE ENCOUNTER
[facility] Call Center    Phone Message    May a detailed message be left on voicemail: yes    Reason for Call: Order(s): Other:   Reason for requested: EEG  Date needed: ASAP   Provider name: Natanael Lobato    3 hour EEG order      Action Taken: Message routed to:  [facility] ([facility] BALDO MORGAN

## 2019-01-11 DIAGNOSIS — R47.89 SPELLS OF SPEECH ARREST: Primary | ICD-10-CM

## 2019-01-11 DIAGNOSIS — R47.89 SPELL OF CHANGE IN SPEECH: Primary | ICD-10-CM

## 2019-01-24 ENCOUNTER — TELEPHONE (OUTPATIENT)
Dept: NEUROLOGY | Facility: CLINIC | Age: 84
End: 2019-01-24

## 2019-01-24 NOTE — TELEPHONE ENCOUNTER
Spoke to Dr Carvalho. Patient was in [place]. Initial concern for stroke with negative imaging. 36 hours EEG negative. Thought could be her PD. Daughter has video of spell. OK to cancel EEG here. She will F/U Asked Dr Carvalho to have daughter bring video.

## 2019-02-18 ENCOUNTER — OFFICE VISIT (OUTPATIENT)
Dept: NEUROLOGY | Facility: CLINIC | Age: 84
End: 2019-02-18
Payer: COMMERCIAL

## 2019-02-18 DIAGNOSIS — G20.A1 PARKINSON DISEASE (H): Primary | ICD-10-CM

## 2019-02-18 RX ORDER — CARBIDOPA AND LEVODOPA 25; 100 MG/1; MG/1
1 TABLET ORAL 3 TIMES DAILY
Qty: 270 TABLET | Refills: 3 | Status: SHIPPED | OUTPATIENT
Start: 2019-02-18 | End: 2019-03-18

## 2019-02-18 ASSESSMENT — PAIN SCALES - GENERAL: PAINLEVEL: NO PAIN (0)

## 2019-02-18 NOTE — LETTER
Service Date: 2019        Jn Carvalho MD   [facility]   [address] [place]  [address]      RE: Olivia Dupont   MRN: [id number]   : 1929      Dear Shakeel:      I saw Olivia in [place] back.  She is accompanied by her daughter.      Thank you for speaking to me about her recent hospitalization at [facility].  As you know, she is a patient with Parkinson disease.  She had been having spells of altered responsiveness, although no clear loss of awareness and no loss of consciousness.  She was admitted to [facility] for one of these spells last month.  She had a negative head MRI scan.  There is no evidence of an acute stroke.  CT angiogram of the neck and intracranial vessels revealed no significant stenosis of the neck vessels and no high-grade stenosis or occlusion of the proximal major intracranial arteries.  She also had more than 24 hours of continuous EEG monitoring that revealed no seizures or epileptiform activity but did show some nonspecific slowing.  I believe the thought was at discharge that these episodes related to her Parkinson disease.      Her daughter did bring in a video of one of the spells that I reviewed. It starts off with her tremor becoming more prevalent.  She is able to look around and respond verbally to her daughter's questions.  As the spell progresses, her voice volume becomes diminished.  She develops a decrease in her facial expression, and while it does appear a little more pronounced on the right than on the left, it is bilateral.  She then did some curious marching movements with her legs bilaterally.      Her daughter indicates that these spells tend to occur around lunchtime.  She continues on a very small dose of Sinemet.  She takes 1/2 of a 10/100 tablet 3 times a day (8 a.m., 3 p.m. and 10 p.m.).        Examination today reveals she is alert and cooperative.  Blood pressure is 120/80 sitting and did not drop when she  stood.  She has a fairly persistent rest tremor of the right upper extremity.  She has bilateral cogwheeling, more in the right arm than the left.      IMPRESSION:  Parkinson disease.      I suspect these episodes do in fact relate to Parkinson disease.      PLAN:  I discussed increasing her Sinemet dose further.  She is going to go to a full 25/100 tablet 3 times a day.  If on this dose she has untoward side effects such as a return of her orthostatic symptoms (she is on Florinef) or confusion, then I would probably reduce the dose to half a tablet 4 times a day.  I did discuss this with her daughter.      I plan to see her back in a month.      Sincerely,         ANDI PETERSON MD        D: 2019   T: 2019   MT: jan    Name:     PEGGY FELTON   MRN:      [id number]        Account:      [id number]   :      1929           Service Date: 2019    Document: [id number]

## 2019-02-18 NOTE — PROGRESS NOTES
Service Date: 2019      Jn Carvalho MD   [facility]   [address] [place]  [address]      RE: Olivia Dupont   MRN: [id number]   : 1929      Dear Shakeel:      I saw Olivia in [place] back.  She is accompanied by her daughter.      Thank you for speaking to me about her recent hospitalization at [facility].  As you know, she is a patient with Parkinson disease.  She had been having spells of altered responsiveness, although no clear loss of awareness and no loss of consciousness.  She was admitted to [facility] for one of these spells last month.  She had a negative head MRI scan.  There is no evidence of an acute stroke.  CT angiogram of the neck and intracranial vessels revealed no significant stenosis of the neck vessels and no high-grade stenosis or occlusion of the proximal major intracranial arteries.  She also had more than 24 hours of continuous EEG monitoring that revealed no seizures or epileptiform activity but did show some nonspecific slowing.  I believe the thought was at discharge that these episodes related to her Parkinson disease.      Her daughter did bring in a video of one of the spells that I reviewed. It starts off with her tremor becoming more prevalent.  She is able to look around and respond verbally to her daughter's questions.  As the spell progresses, her voice volume becomes diminished.  She develops a decrease in her facial expression, and while it does appear a little more pronounced on the right than on the left, it is bilateral.  She then did some curious marching movements with her legs bilaterally.      Her daughter indicates that these spells tend to occur around lunchtime.  She continues on a very small dose of Sinemet.  She takes 1/2 of a 10/100 tablet 3 times a day (8 a.m., 3 p.m. and 10 p.m.).      Examination today reveals she is alert and cooperative.  Blood pressure is 120/80 sitting and did not drop when she  stood.  She has a fairly persistent rest tremor of the right upper extremity.  She has bilateral cogwheeling, more in the right arm than the left.      IMPRESSION:  Parkinson disease.      I suspect these episodes do in fact relate to Parkinson disease.      PLAN:  I discussed increasing her Sinemet dose further.  She is going to go to a full 25/100 tablet 3 times a day.  If on this dose she has untoward side effects such as a return of her orthostatic symptoms (she is on Florinef) or confusion, then I would probably reduce the dose to half a tablet 4 times a day.  I did discuss this with her daughter.     ADDENDUM 3/7/19: Patient shaky and fell once since increasing Sinemet. Spoke to daughter Isabel. Will reduce dose to 1/2 table 4 times a day.     I plan to see her back in a month.      Sincerely,         ANDI PETERSON MD             D: 2019   T: 2019   MT: jan      Name:     PEGGY FELTON   MRN:      [id number]        Account:      [id number]   :      1929           Service Date: 2019      Document: [id number]

## 2019-03-05 ENCOUNTER — TELEPHONE (OUTPATIENT)
Dept: NEUROLOGY | Facility: CLINIC | Age: 84
End: 2019-03-05

## 2019-03-05 NOTE — TELEPHONE ENCOUNTER
Returned a call to Isabel.  She reports that her mother started Sinemet 25/100 on 2/21 and has felt unstable since taking this dose and had one fall without injury.  They are wondering about going back on the 10/100.  I told Isabel that I would let Dr. Lobato know and call her back with his reply.  In the meantime I advised that she give her mom a half a tablet of sinemet at her three pm dose and a full tablet and bedtime and a half tablet at 8 am.  She verbalized understanding and agreement.    Message sent to Dr. Lobato

## 2019-03-05 NOTE — TELEPHONE ENCOUNTER
[facility] Call Center    Phone Message    May a detailed message be left on voicemail: yes    Reason for Call: Medication Question or concern regarding medication   Prescription Clarification  Name of Medication: carbidopa-levodopa (SINEMET)  MG tablet  Prescribing Provider: Dr Lobato   Pharmacy:    What on the order needs clarification? Isabel calling to report that Theshaana has been on the 25 - 100's since Feb 21st and she reports that Thecla feels unstable and actually fell this afternoon.  (She is uninjured)  Isabel took her blood pressure immediately and it was 179-105, a minute later it was 153/95, and 15 min later was 133/84 (Ruchia was sitting at the time of the last reading.)  Isabel is wondering if they should go back to the 10 - 100's vs the 25 - 100's.  She is due to give Thecla her next meds at 3pm today.  Please call her back as soon as possible to discuss          Action Taken: Message routed to:  [facility]

## 2019-03-18 ENCOUNTER — OFFICE VISIT (OUTPATIENT)
Dept: NEUROLOGY | Facility: CLINIC | Age: 84
End: 2019-03-18
Payer: COMMERCIAL

## 2019-03-18 VITALS — OXYGEN SATURATION: 98 %

## 2019-03-18 DIAGNOSIS — G20.A1 PARKINSON DISEASE (H): ICD-10-CM

## 2019-03-18 RX ORDER — CARBIDOPA AND LEVODOPA 25; 100 MG/1; MG/1
0.5 TABLET ORAL 4 TIMES DAILY
Qty: 270 TABLET | Refills: 3 | Status: SHIPPED | OUTPATIENT
Start: 2019-03-18

## 2019-03-18 ASSESSMENT — PAIN SCALES - GENERAL: PAINLEVEL: NO PAIN (0)

## 2019-03-18 NOTE — NURSING NOTE
Chief Complaint   Patient presents with     RECHECK     [facility] RETURN 1 MO F/U       Angelia Acosta, EMT

## 2019-03-18 NOTE — LETTER
3/18/2019       RE: Olivia Dupont  [address] [address]     Dear Colleague,    Thank you for referring your patient, Olivia Dupont, to the [facility] NEUROLOGY at [facility]. Please see a copy of my visit note below.    Service Date: 2019      Jn Carvalho MD   [facility]   [address] [place] [address]      RE: Olivia Dupont   MRN: [id number]   : 1929      Dear Shakeel:      I saw Olivia Dupont back.  She is accompanied by her daughter as usual.      When I saw her a month ago, it was decided to increase her Sinemet dose to a full tablet 3 times a day to see if this would have a positive impact on the rather complex wearing-off spells she had been experiencing.  Unfortunately, she had trouble tolerating the higher dose.  Her daughter contacted me indicating her mother felt more shaky.  The dose was reduced to a half a tablet 4 times a day and she does seem to be tolerating this better. She has had no further spells.     She did have 1 fall when she got up from the table.  She does not recall if she felt dizzy or faint when that happened.  She does ambulate, generally with a walker.      She has some mild cognitive issues.  Whether or not this relates to the Sinemet is unclear.  She has not had any delusions or hallucinations.      She, in addition, continues on Florinef 0.1 mg per day.  Her daughter indicated that you tried to double the dose at one point, but it caused somewhat intolerable urinary frequency.      PHYSICAL EXAMINATION:  Examination 3 hours after her most recent dose of Sinemet reveals she is alert and cooperative.  She does have a low amplitude, but fairly persistent tremor of the right upper extremity.  She has bilateral upper extremity cogwheeling.  She is able to get up on her own with the aid of her rolling walker.  She ambulates with a narrow base.  It took her  3-4 steps to turn but she did maintain her balance.      Blood pressure sitting 137/63 with a pulse of 66.  Standing, her blood pressure is 118/57 with a pulse of 70.      IMPRESSION:   1.  Parkinson's disease.   2.  Mild orthostatic hypotension.      PLAN:  She is going to continue on one-half of a 25/100 Sinemet 4 times a day.      We discussed increasing her Florinef dose, but her daughter informed me she had trouble tolerating a 0.2 mg due to urinary frequency and so she will continue on 0.1 mg a day.  I did caution her about getting up too quickly.  I suggested in the morning, she have some caffeinated drink and also she could utilize salt in her diet.      I am going to be seeing her back in 6 months.      Sincerely,         Natanael Lobato MD       D: 2019   T: 2019   MT: al      Name:     PEGGY FELTON   MRN:      [id number]        Account:      [id number]   :      1929           Service Date: 2019      Document: [id number]

## 2019-03-18 NOTE — PROGRESS NOTES
Service Date: 2019      Jn Carvalho MD   [facility]   [address] [place] [address]      RE: Olivia Dupont   MRN: [id number]   : 1929      Dear Shakeel:      I saw Olivia Dupont back.  She is accompanied by her daughter as usual.      When I saw her a month ago, it was decided to increase her Sinemet dose to a full tablet 3 times a day to see if this would have a positive impact on the rather complex wearing-off spells she had been experiencing.  Unfortunately, she had trouble tolerating the higher dose.  Her daughter contacted me indicating her mother felt more shaky.  The dose was reduced to a half a tablet 4 times a day and she does seem to be tolerating this better. She has had no further spells.     She did have 1 fall when she got up from the table.  She does not recall if she felt dizzy or faint when that happened.  She does ambulate, generally with a walker.      She has some mild cognitive issues.  Whether or not this relates to the Sinemet is unclear.  She has not had any delusions or hallucinations.      She, in addition, continues on Florinef 0.1 mg per day.  Her daughter indicated that you tried to double the dose at one point, but it caused somewhat intolerable urinary frequency.      PHYSICAL EXAMINATION:  Examination 3 hours after her most recent dose of Sinemet reveals she is alert and cooperative.  She does have a low amplitude, but fairly persistent tremor of the right upper extremity.  She has bilateral upper extremity cogwheeling.  She is able to get up on her own with the aid of her rolling walker.  She ambulates with a narrow base.  It took her 3-4 steps to turn but she did maintain her balance.      Blood pressure sitting 137/63 with a pulse of 66.  Standing, her blood pressure is 118/57 with a pulse of 70.      IMPRESSION:   1.  Parkinson's disease.   2.  Mild orthostatic hypotension.      PLAN:  She is going to continue on  one-half of a 25/100 Sinemet 4 times a day.      We discussed increasing her Florinef dose, but her daughter informed me she had trouble tolerating a 0.2 mg due to urinary frequency and so she will continue on 0.1 mg a day.  I did caution her about getting up too quickly.  I suggested in the morning, she have some caffeinated drink and also she could utilize salt in her diet.      I am going to be seeing her back in 6 months.      Sincerely,         MD ANDI Bhatt MD             D: 2019   T: 2019   MT: al      Name:     PEGGY FELTON   MRN:      [id number]        Account:      [id number]   :      1929           Service Date: 2019      Document: [id number]

## 2019-06-06 ENCOUNTER — RECORDS - HEALTHEAST (OUTPATIENT)
Dept: LAB | Facility: CLINIC | Age: 84
End: 2019-06-06

## 2019-06-06 LAB
ANION GAP SERPL CALCULATED.3IONS-SCNC: 7 MMOL/L (ref 5–18)
BUN SERPL-MCNC: 16 MG/DL (ref 8–28)
CALCIUM SERPL-MCNC: 9.4 MG/DL (ref 8.5–10.5)
CHLORIDE BLD-SCNC: 110 MMOL/L (ref 98–107)
CO2 SERPL-SCNC: 28 MMOL/L (ref 22–31)
CREAT SERPL-MCNC: 0.67 MG/DL (ref 0.6–1.1)
GFR SERPL CREATININE-BSD FRML MDRD: >60 ML/MIN/1.73M2
GLUCOSE BLD-MCNC: 82 MG/DL (ref 70–125)
HGB BLD-MCNC: 9.6 G/DL (ref 12–16)
POTASSIUM BLD-SCNC: 3.7 MMOL/L (ref 3.5–5)
SODIUM SERPL-SCNC: 145 MMOL/L (ref 136–145)

## 2019-06-19 ENCOUNTER — OFFICE VISIT (OUTPATIENT)
Dept: NEUROLOGY | Facility: CLINIC | Age: 84
End: 2019-06-19
Payer: COMMERCIAL

## 2019-06-19 ENCOUNTER — RECORDS - HEALTHEAST (OUTPATIENT)
Dept: LAB | Facility: CLINIC | Age: 84
End: 2019-06-19

## 2019-06-19 VITALS — BODY MASS INDEX: 25.23 KG/M2 | OXYGEN SATURATION: 97 % | WEIGHT: 142.4 LBS

## 2019-06-19 DIAGNOSIS — G20.A1 PARKINSON DISEASE (H): Primary | ICD-10-CM

## 2019-06-19 PROCEDURE — 99213 OFFICE O/P EST LOW 20 MIN: CPT | Performed by: PSYCHIATRY & NEUROLOGY

## 2019-06-19 RX ORDER — AMOXICILLIN 250 MG
1 CAPSULE ORAL 2 TIMES DAILY
COMMUNITY

## 2019-06-19 RX ORDER — POLYETHYLENE GLYCOL 3350 17 G/17G
1 POWDER, FOR SOLUTION ORAL DAILY
COMMUNITY

## 2019-06-19 RX ORDER — LANOLIN ALCOHOL/MO/W.PET/CERES
1 CREAM (GRAM) TOPICAL AT BEDTIME
COMMUNITY

## 2019-06-19 RX ORDER — FAMOTIDINE 20 MG
2 TABLET ORAL DAILY
COMMUNITY

## 2019-06-19 RX ORDER — FLUORIDE TOOTHPASTE
TOOTHPASTE DENTAL
COMMUNITY
Start: 2019-06-08

## 2019-06-19 RX ORDER — ACETAMINOPHEN 500 MG
1000 TABLET ORAL PRN
COMMUNITY
Start: 2019-06-04

## 2019-06-19 ASSESSMENT — PAIN SCALES - GENERAL: PAINLEVEL: NO PAIN (0)

## 2019-06-19 NOTE — NURSING NOTE
Olivia Dupont's goals for this visit include: return  She requests these members of her care team be copied on today's visit information:     PCP: Jn Carvalho    Referring Provider:  Referred Self, MD  No address on file    Wt 64.6 kg (142 lb 6.4 oz)   SpO2 97%   BMI 25.23 kg/m      Do you need any medication refills at today's visit? y

## 2019-06-19 NOTE — PROGRESS NOTES
Visit Date:   2019            Jn Carvalho MD   [facility]    [address] [place] [address]      PATIENT:  Olivia Dupont   MRN:  [id number]   :  1929      Dear Shakeel,      I saw Olivia Dupont back.  She was accompanied by her daughter.      Since I saw her in March, she has had 2 hospitalizations.  The first was on .  This was apparently for acute delirium.  Her daughter indicates she was eating less, was tired, anxious, and was talking about dying.  She did have some laboratory work done, which included a normal CBC.  A basic metabolic panel revealed an elevated BUN to creatinine ratio, perhaps suggesting some mild dehydration.  Urinalysis was negative.  BNP was 308.      She was hospitalized again on .  She and her daughter were in the kitchen.  The patient was drying some dishes.  Her daughter turned and saw her a stumble sideways and then go down.  She fractured her right femur.  There was no loss of consciousness.  She remembers the fall.  She again had a head CT scan done at that time that did not reveal any acute changes.      She has since come off Florinef as she did develop fluid retention and possibly some congestive heart failure on the drug.      She continues on Sinemet 25/100 mg 1/2 tablet 4 times a day and seems to be tolerating this dose well.  She had difficulty tolerating 3 full tablets a day.      She has not had any of her acute spells that appear to be rather dramatic wearing off since increasing her Sinemet.      In terms of her cognition, she reports she feels foggy in the morning, but her daughter indicates otherwise.  She is quite sharp.      She is now at the [facility] and getting therapy.  Actually, her daughter feels it has helped her gait.      PHYSICAL EXAMINATION:   GENERAL:  Reveals a frail female.  She is, however, alert and cooperative.   VITAL SIGNS:  Heart rate 62.   Blood pressure 99/60 sitting and 99/62 when she stands.   NEUROLOGIC:  She is oriented to place and time and remembers my name.  She has full extraocular motility and otherwise, cranial nerves II-XII appear intact.  She has no upper or lower extremity weakness.  She has a fairly persistent rest tremor of the right upper extremity and bilateral cogwheeling of the arms.  With assistance, she can get up out of her wheelchair and ambulate with the aid of a walker.  She appears stable, although she has a slight limp now because of the right hip.  She has a narrow base.      IMPRESSION:  Parkinson's disease.      PLAN:  Unfortunately, I do not have much more to offer her in terms of her treatment.  I suspect her imbalance is going to be an ongoing issue.  I do not think increasing her Sinemet would help that much, and I doubt that she would tolerate a higher dose.  I explained all this to the patient and her daughter.      She does have a followup appointment scheduled to see me in September.      ADDENDUM:  I did review her [facility] notes.  She now has a DNR directive. Confirmed this with patient and her daughter.        ANDI PETERSON MD             D: 2019   T: 2019   MT:       Name:     PEGGY FELTON   MRN:      [id number]        Account:      [id number]   :      1929           Visit Date:   2019      Document: [id number]       cc: Jn Carvalho MD

## 2019-06-19 NOTE — LETTER
2019         RE: Olivia Dupont  [address] [address]        Dear Colleague,    Thank you for referring your patient, Olivia Dupont, to the [facility]. Please see a copy of my visit note below.    Visit Date:   2019            Jn Carvalho MD   [facility]    [address] [place] [address]      PATIENT:  Olivia Dupont   MRN:  [id number]   :  1929      Dear Shakeel,      I saw Olivia Dupont back.  She was accompanied by her daughter.      Since I saw her in March, she has had 2 hospitalizations.  The first was on .  This was apparently for acute delirium.  Her daughter indicates she was eating less, was tired, anxious, and was talking about dying.  She did have some laboratory work done, which included a normal CBC.  A basic metabolic panel revealed an elevated BUN to creatinine ratio, perhaps suggesting some mild dehydration.  Urinalysis was negative.  BNP was 308.      She was hospitalized again on .  She and her daughter were in the kitchen.  The patient was drying some dishes.  Her daughter turned and saw her a stumble sideways and then go down.  She fractured her right femur.  There was no loss of consciousness.  She remembers the fall.  She again had a head CT scan done at that time that did not reveal any acute changes.      She has since come off Florinef as she did develop fluid retention and possibly some congestive heart failure on the drug.      She continues on Sinemet 25/100 mg 1/2 tablet 4 times a day and seems to be tolerating this dose well.  She had difficulty tolerating 3 full tablets a day.      She has not had any of her acute spells that appear to be rather dramatic wearing off since increasing her Sinemet.      In terms of her cognition, she reports she feels foggy in the morning, but her daughter indicates otherwise.  She is quite sharp.      She is now at the  [facility] and getting therapy.  Actually, her daughter feels it has helped her gait.      PHYSICAL EXAMINATION:   GENERAL:  Reveals a frail female.  She is, however, alert and cooperative.   VITAL SIGNS:  Heart rate 62.  Blood pressure 99/60 sitting and 99/62 when she stands.   NEUROLOGIC:  She is oriented to place and time and remembers my name.  She has full extraocular motility and otherwise, cranial nerves II-XII appear intact.  She has no upper or lower extremity weakness.  She has a fairly persistent rest tremor of the right upper extremity and bilateral cogwheeling of the arms.  With assistance, she can get up out of her wheelchair and ambulate with the aid of a walker.  She appears stable, although she has a slight limp now because of the right hip.  She has a narrow base.      IMPRESSION:  Parkinson's disease.      PLAN:  Unfortunately, I do not have much more to offer her in terms of her treatment.  I suspect her imbalance is going to be an ongoing issue.  I do not think increasing her Sinemet would help that much, and I doubt that she would tolerate a higher dose.  I explained all this to the patient and her daughter.      She does have a followup appointment scheduled to see me in September.      ADDENDUM:  I did review her [facility] notes.  She now has a DNR directive. Confirmed this with patient and her daughter.        ANDI PETERSON MD             D: 2019   T: 2019   MT:       Name:     PEGGY FELTON   MRN:      [id number]        Account:      [id number]   :      1929           Visit Date:   2019      Document: [id number]       cc: Jn Carvalho MD       Again, thank you for allowing me to participate in the care of your patient.        Sincerely,        Andi Peterson MD

## 2019-06-20 LAB
ANION GAP SERPL CALCULATED.3IONS-SCNC: 5 MMOL/L (ref 5–18)
BUN SERPL-MCNC: 22 MG/DL (ref 8–28)
CALCIUM SERPL-MCNC: 9.5 MG/DL (ref 8.5–10.5)
CHLORIDE BLD-SCNC: 110 MMOL/L (ref 98–107)
CO2 SERPL-SCNC: 29 MMOL/L (ref 22–31)
CREAT SERPL-MCNC: 0.71 MG/DL (ref 0.6–1.1)
GFR SERPL CREATININE-BSD FRML MDRD: >60 ML/MIN/1.73M2
GLUCOSE BLD-MCNC: 78 MG/DL (ref 70–125)
HGB BLD-MCNC: 9 G/DL (ref 12–16)
POTASSIUM BLD-SCNC: 3.9 MMOL/L (ref 3.5–5)
SODIUM SERPL-SCNC: 144 MMOL/L (ref 136–145)

## 2019-09-23 ENCOUNTER — OFFICE VISIT (OUTPATIENT)
Dept: NEUROLOGY | Facility: CLINIC | Age: 84
End: 2019-09-23
Payer: COMMERCIAL

## 2019-09-23 VITALS
SYSTOLIC BLOOD PRESSURE: 131 MMHG | OXYGEN SATURATION: 96 % | HEIGHT: 62 IN | BODY MASS INDEX: 26.81 KG/M2 | HEART RATE: 71 BPM | DIASTOLIC BLOOD PRESSURE: 49 MMHG | WEIGHT: 145.7 LBS

## 2019-09-23 DIAGNOSIS — G20.A1 PARKINSON DISEASE (H): Primary | ICD-10-CM

## 2019-09-23 ASSESSMENT — PAIN SCALES - GENERAL: PAINLEVEL: NO PAIN (0)

## 2019-09-23 ASSESSMENT — MIFFLIN-ST. JEOR: SCORE: 1034.14

## 2019-09-23 NOTE — PROGRESS NOTES
Service Date: 2019      nJ Carvalho MD   [facility]   [address] [place]  [address]      RE: Olivia Dupont   MRN: [id number]   : 1929      Dear Shakeel:      I saw Olivia Dupont back.  She is accompanied by her daughter, Isabel.  She is a patient with Parkinson disease and a tendency towards orthostatic hypotension.      According to Isabel, who she lives with, the patient has been doing well.  She will get tired.  She did not sleep well last night.  When she is tired, it tends to affect her memory.  She is recovering from the right femur fracture she suffered in May.  She ambulates with a rolling walker.      She continues on 1/2 of a Sinemet 25/100 four times a day.  She had trouble tolerating 3 full tablets a day.  She has not had any further acute spells that were probably dramatic wearing off episodes since the increase in her Sinemet.      Examination reveals initially she appears a bit fatigued but subsequently becomes alert and cooperative.  Her blood pressure is 131/49 sitting and 110/64 when she stands, which is an improvement.  She has a soft voice and also a slight tremor to her voice.  She has a persistent rest tremor of the right upper extremity.  With assistance, she can get up out of a chair and ambulate with her rolling walker and she does quite well with the walker.  She is no longer limping.  She has a narrow base but a good stride and is stable when turning the walker.      IMPRESSION:  Parkinson disease.      PLAN:  I am not making any change in her medication.      I will be seeing her back in 6 months.      Sincerely,         ANDI PETERSON MD             D: 2019   T: 2019   MT: jan      Name:     OLIVIA DUPONT   MRN:      [id number]        Account:      [id number]   :      1929           Service Date: 2019      Document: [id number]

## 2019-09-23 NOTE — LETTER
2019       RE: Olivia Dupont  [address] [address]     Dear Colleague,    Thank you for referring your patient, Olivia Duopnt, to the [facility] NEUROLOGY at [facility]. Please see a copy of my visit note below.    Service Date: 2019      Jn Carvalho MD   [facility]   [address] [place]  [address]      RE: Olivia Dupont   MRN: [id number]   : 1929      Dear Shakeel:      I saw Olivia Dupont back.  She is accompanied by her daughter, Isabel.  She is a patient with Parkinson disease and a tendency towards orthostatic hypotension.      According to Isabel, who she lives with, the patient has been doing well.  She will get tired.  She did not sleep well last night.  When she is tired, it tends to affect her memory.  She is recovering from the right femur fracture she suffered in May.  She ambulates with a rolling walker.      She continues on 12 of a Sinemet 25/100 four times a day.  She had trouble tolerating 3 full tablets a day.  She has not had any further acute spells that were probably dramatic wearing off episodes since the increase in her Sinemet.      Examination reveals initially she appears a bit fatigued but subsequently becomes alert and cooperative.  Her blood pressure is 131/49 sitting and 110/64 when she stands, which is an improvement.  She has a soft voice and also a slight tremor to her voice.  She has a persistent rest tremor of the right upper extremity.  With assistance, she can get up out of a chair and ambulate with her rolling walker and she does quite well with the walker.  She is no longer limping.  She has a narrow base but a good stride and is stable when turning the walker.      IMPRESSION:  Parkinson disease.      PLAN:  I am not making any change in her medication.      I will be seeing her back in 6 months.      Sincerely,         ANDI PETERSON MD             D:  2019   T: 2019   MT: jan      Name:     PEGGY FELTON   MRN:      [id number]        Account:      [id number]   :      1929           Service Date: 2019      Document: [id number]

## 2019-12-02 ENCOUNTER — TELEPHONE (OUTPATIENT)
Dept: NEUROLOGY | Facility: CLINIC | Age: 84
End: 2019-12-02

## 2019-12-02 NOTE — TELEPHONE ENCOUNTER
[facility] Call Center    Phone Message    May a detailed message be left on voicemail: no    Reason for Call: Other: Pt's daughter Isabel reports increased confusion and occasional episodes of half-sleep occuring in the last 2 weeks. Pt is not really able to hold a conversation or maintain cognitive awareness for long periods. Isabel reports a fall where she might have hit her head on a towel bar; Pt was admitted to ER but scans showed no abnormalities or injuries. Memory is spotty depending on level of coherence. Please call Isabel back to discuss symptoms.     Action Taken: Message routed to:  [facility]): [facility]ULT [facility]

## 2019-12-02 NOTE — TELEPHONE ENCOUNTER
I called pt Daughter Isabel back to discuss pt symptoms of increased confusion and fatigue. Pt had a fall on 11/18 in bathroom where she fell back onto toilet and hit ribs and head. Pt did not tell daughter she hit her head until last week; taken to ER on 11/30. CT negative for bleed. Pt has also had increased trouble swallowing. Daughter has her on a soft diet to compensate. Pt has lost about 5lbs. She is drinking well. Last BM was 11/30.     Isabel is wondering if these symptoms are related to parkinsons as the CT was negative with fall. She would our recommendations for care. I will route to pt provider and let Isabel know response.     Kim LOZA

## 2019-12-03 ENCOUNTER — TELEPHONE (OUTPATIENT)
Dept: NEUROLOGY | Facility: CLINIC | Age: 84
End: 2019-12-03

## 2019-12-03 NOTE — TELEPHONE ENCOUNTER
I called to update Isabel that Dr. Lobato would like her mother to come in sooner. I let her know our next available was January 6th at 10am. She said that should work for them. She updated me that her mother had a better night and is doing well this morning.     Kim LOZA

## 2019-12-04 ENCOUNTER — TELEPHONE (OUTPATIENT)
Dept: NEUROLOGY | Facility: CLINIC | Age: 84
End: 2019-12-04

## 2019-12-04 NOTE — TELEPHONE ENCOUNTER
"Spoke to daughter Isabel. Around 11/28 she went into a \"state\" that lasted several hours and for which she had no recollection. Not exactly like she had when hospitalized at [place] in past. 2 days later she fell back putting on her pants and struck her head. Had Head CT on 11/30 which was negative for acute changes. Now back to baseline except memory seems to be declining. She has an appointment to see me in early January. If recurrent spells may refer to Epilepsy but possible related to PD/possible Lewy Body  "

## 2021-05-25 ENCOUNTER — RECORDS - HEALTHEAST (OUTPATIENT)
Dept: ADMINISTRATIVE | Facility: CLINIC | Age: 86
End: 2021-05-25

## 2021-05-26 ENCOUNTER — RECORDS - HEALTHEAST (OUTPATIENT)
Dept: ADMINISTRATIVE | Facility: CLINIC | Age: 86
End: 2021-05-26

## 2021-05-27 ENCOUNTER — RECORDS - HEALTHEAST (OUTPATIENT)
Dept: ADMINISTRATIVE | Facility: CLINIC | Age: 86
End: 2021-05-27

## 2021-05-28 ENCOUNTER — RECORDS - HEALTHEAST (OUTPATIENT)
Dept: ADMINISTRATIVE | Facility: CLINIC | Age: 86
End: 2021-05-28

## 2021-05-29 ENCOUNTER — RECORDS - HEALTHEAST (OUTPATIENT)
Dept: ADMINISTRATIVE | Facility: CLINIC | Age: 86
End: 2021-05-29

## 2021-05-30 ENCOUNTER — RECORDS - HEALTHEAST (OUTPATIENT)
Dept: ADMINISTRATIVE | Facility: CLINIC | Age: 86
End: 2021-05-30

## 2021-07-20 NOTE — PROGRESS NOTES
Physical Therapy  Physical Therapy  Movement Disorders Discharge Summary    Medical Diagnosis: Parkinson's Disease    Treatment Diagnosis: Postural instability, decreased LE strength, unstable balance    Referring MD: Dr. Natanael Lobato  Onset Date: 12/6/2016  Start of Care: 12/19/2016     Time of Evaluation: 1100  Time of last PD med: NA  Time of next PD med: NA    Transitional Movements   Initial Post Treatment Goals      Sit ? Stand  Mod I I    360 degree turn (steps) 10 8    TUG  (sec) 13.12 11.16 12 - MET   TUG with Cognitive task (sec) 16 13.78 14 - MET   Strength  Initial Post Treatment Goals   30sec Chair Stand (#)  10 12 12 - MET   Balance  Initial Post Treatment Goals   Mini- BESTest  11/28 20/28 16/28 - MET   ABC score  75% 85.3% 13% increase - NOT MET   Motor Planning/Coordination Initial Post Treatment Goals   Four Square Step Test 26.14 17 21 - MET   (best of 2 trials)      Gait Initial Post Treatment Goals   Preferred gait speed (m/s) 1.1 1.12    Fast gait speed (m/s) 1.22 1.54    # steps in 6m 10 10    Activity Tolerance Initial Post Treatment Goals   6 min walk test (ft)  NT 1372        Functional Outcome Changes: Patient made progress in all areas of balance, strength, activity tolerance, and functional mobility. Her score on the mini BEST test improved from 11/28 to 20/28 and has a result her balance confidence has improved to 85.3%.  She is now able to stand from a chair without the assistance of UEs.  Overall, the patient notes improvements in her ease of mobility, balance and balance confidence since beginning therapy    Evaluation: Goals Met? Partially Comments: Patient met all goals except 13% improvement on the ABC.  She did improve by 10% during the course of physical therapy.     Patient seen from 12/19/2016 to 2/16/2017  for 11 treatment sessions.  Recommendation: D/C Patient to follow up with PT assessment in 6-9 months for best Parkinson's management.

## 2021-07-20 NOTE — PROGRESS NOTES
"Physical Therapy  Physical Therapy  Movement Disorder Treatment Note     MEDICARE    Date: 1/24/2017   Visit #: 4/11 - (12/19/2016  to 3/14/2017)  Rx Units: 2- TE, 2- NR  Total OP Minutes: 55    Pain:  No    Subjective:  Pt reports she has been doing her HEP at home and feels the most difficulty she has is coordinating her movements \"I do one thing, then I forgot the other.\"    Objective:  Therapeutic Exercise:  Total Minutes: 25  Sustained Movements (sitting)  4reps  x 1-2 sets      Exercise   Reps   Sets   Effort     1A Floor to Ceiling Without Flicking   6 2 5, 9     1B Side to Side Without Flicking   4 2 each 9      Comments: 1A - VC for BIG posture and midline during exercise and between reps. Pt tends to laterally flex trunk to L.   First 2 reps pt required VCs for \"Palm Up\"  2nd set pt was able to self correct posture with only modeling  1B - No alternating. 25-50% shaping for trunk/pelvic rotation. Pt only needed 1-2 VCs for Palm Up \"Hold your platter!\"  VCs for bigger gaze L and R in order to improve overall rotation.    Reviewed effort scale and how to enmanuel self. Pt reports a lot of difficulty choosing an effort level.      Other:      -NuStep x 8 minutes  workload #5 Ave METS: 3.3   SPM: 96  PRE: 3/10   Pt able to coordinate UEs and LEs without difficulty.  No significant dyspnea noted even while maintaining conversation. VCs to maintain pace >70SPM    -BIG walking x 600'   Effort 8/10  VCs and Modeling for BIG arms   R UE had little arm swing however with cuing pt able to increase amplitude.  Pt demos good step length and appropriate pace with consistent verbal encouragement.      Neuro-Reeducation/Balance:  Total Minutes: 30  Multidirectional Repetitive Movements.  8-16reps  x 1-2 sets    Step and Reach:   UE support: 2A, 2B  Chairs Nearby: 2A, 2B      Exercise   Reps   Sets   Effort     2A Forward Step and Reach    10 3 total 8     2B Sideward Step and Reach    10     2 7     2C Backward Step and Reach " "10 1 7     Comments: 2A - Alternating LEs. VCs and modeling for increased power and effort with stepping \"Kill the bug\"  VC for BIG, deliberate step back to center and cues to put weight forward onto stepping leg. 1st 2 sets pt used B UE support.  3rd set completed with single UE support and alternating LE No significant change  2B - Single UE support on both sets  VCs for BIG rotation \"Look at your friends\" to the L and R  Good effort  VCs for separation of start and end point  ** 2C - Side by side modeling  Moved very slowly through reps however pt required no UE support. LOB x 1 which was mild and pt able to self correct  Moderate VCs for \"toe up\" and big arm swing posterior      Rock and Reach:  UE support: 2D  Chairs Nearby:       Exercise   Reps   Sets   Effort     2D Left leg Forward/Backward 20 2 5-7     2D Right leg Forward/Backward 20 2 5-7     2E Side to Side        Comments: 2D-1st set used single UE support, 2nd set no UE support. Mild rocking motion achieved however with min VCs pt able to maintain toe up, heel up.  Other:   Obstacle Course: Over 9 obstacles including SEC and quad canes. 4 reps  CGA, pt had no LOB and was able to clear canes without difficulty. Pt had most difficulty judging step length to manage obstacle.  Repeated course while side stepping L x 1 , R x 1  Min A to maintain midline pelvic alignment. Again pt required cuing for judging step length, but no LOB.       Gait Training:   Total Minutes:     Time Speed (MPH) UE support Direction Incline Comments                                      Other:     Therapeutic Activity  Total Minutes:      Functional Movement  Rep  Sets  Effort    Rolling        Supine to Sit       Sit to Stand from standard chair       Sit to Stand from low, soft chair       Sit and Reach       Walk and Turn       PWR Moves:       PWR Moves:        Comments:     Big Walking:     Other:      Carryover Assignment: issued 1A, 1B, 2A, 2B, 2C to HEP    Assessment/Plan " for week:  1/23- 1/27/2017  Pt demonstrates good activity tolerance requiring minimal rest breaks throughout treatment.  Pt continues to require increased cuing and modeling to improve amplitude, power and rotation with all BIG exercises.  Continued hip and core strengthening would be beneficial to aide with pt's stability with functional mobility.  Pt's L knee demonstrates obvious valgus in seated position most likely due to hip and glut weakness. More time spent this date on BIG exercises increasing reps and sets while adding the additional exercise of backward step and reach.

## 2021-07-20 NOTE — PROGRESS NOTES
Physical Therapy  PHYSICAL THERAPY  MOVEMENT DISORDER:  INITIAL EVALUATION    SUBJECTIVE INFORMATION    Diagnosis: Parkinson's Disease                                         Date of diagnosis: 9/2016                                         Date of last Neurologist visit: April 2017  Treatment Diagnosis: bradykinesia, lower extremity weakness, postural instability  Precautions/pmh: HL - currently off meds, HTN - currently off meds, skin CA  First movement disorder symptom presentation: R side tremors                                      Date: 2013  Non-motor symptoms: Visual impairments, Early mild cognitive impairment, Urinary symptoms and leg cramps    Time of Evaluation: 0900       Time of last PD med: 0830 (half pills)      Time of next PD med: 1500 (half pills)  On/Off presentation/symptoms: minor changes in tremors with medication  History of PD specific PT? Yes: When and where?: [facility] Jan-Feb 2017    Pain: No, however notes hip stiffness    Living Situation:Home and 3 steps with 1 HR - laundry in basement   Caregiver Support: lives with daughter  Equipment: none  Current Activity Level: enjoys reading, cooking and cleaning.  [employer] 2x/wk - uses NuStep, recumbent bike for about 30 minutes; BIG exercises daily.    Chief Mobility Complaint: feels more unsteadiness, bed mobility in the middle of the night  Patient's Functional Goal: continue walking and improve balance    Fall history:no falls this year    Freezing: Never      OBJECTIVE INFORMATION    Bradykinesia and Hypokinesia (Combining slowness, hesitency, decreased arm swing, small amplitude and poverty of movement in general):  Moderate    Dyskinesia:No    Posture: forward head, rounded shoulders and increased thoracic kyphosis     Postural Stability: Posterior tug test (Response to sudden posterior displacement produced by pull on shoulders while patient is erect, with eyes open and feet slightly apart.  Patient is  prepared.)  Retropulsion but recovers unaided    Transitional Movements  Sit?Stand:   Modified Independent       Sit? Supine:   Not Tested  360 degree turn:  12 steps CW and CCW    TUG(Timed Up and Go): 13.69 seconds    (>13 sec:  Falls Risk)      Divided Attention   Cognitive TU.47 seconds (Task):  Counting backwards by 3s from 55 (55-31 correctly)    Strength   30 Second Chair Stand:  # 9 With UE A from armed chair   Age/Gender Norm:10 without UE A       Balance      Eyes open Eyes closed     Romberg (sec) 30 NT    Left LE Right LE     Single Leg Stance (sec) unable 1.5     Balance Assessment: Mini-BEST  (<)   1) Sit <> Stand: (1) Moderate   2) Rise to Toes: (0) Severe   3) Stand on One Leg: (0) Severe  L: unable R: 1.5 seconds   4) Compensatory Stepping Correction-Forward: (2) Normal   5) Compensatory Stepping Correction-Backward: (1) Moderate   6) Compensatory Stepping Correction-Lateral: (0) Severe L: (0) Severe R: (0) Severe   7) Eyes Open, Firm Surface: (2) Normal  >30 seconds  8) Eyes Closed, Foam Surface: (0) Severe 2.3 seconds before eyes open   9) Incline, Eyes Closed: (0) Severe 1.5 seconds before eyes open   10) Change in Gait Speed: (2) Normal   11) Walk with Head Turns-Horizontal: (1) Moderate Mild path deviations  12) Walk with Pivot Turns: (1) Moderate 5-6 steps  13) Step Over Obstacles: (1) Moderate Touches boxes   14) TUG with Dual Tasks: (1) Moderate  TU.69 seconds  Cognitive TU.47 seconds        Motor Planning / Coordination   Four Square Step Test Trial 1: 26.62   Trial 2: 27.66      >15 sec:  Falls Risk        26.62  seconds (best of 2 trials)    Comments: struck dowels both trials    Gait   Preferred Gait Speed  6 meters in 5.93 seconds Meters/second: 1.01 Age/Gender Norm:   0.97 meters/second  # steps in 6 meters: 11  Assistive Device: none  Gait Speed Fall risk:  Med Falls Risk (0.6-1.2 m/s)  Gait Analysis: decreased step length, decreased heel strike,  downward gaze    Fast Gait Speed  6 meters in 4.9 seconds Meters/second: 1.22 Age/Gender Norm:   NA meters/second  # steps in 6 meters: 10  Assistive Device: none  Gait Analysis: increased step length, more consistent heel strike, continues with downward gaze    Activity Tolerance   6 minute walk test:  1214 feet    RPD:  NR         Total OP Minutes: 60      Rx Units: Evaluation

## 2021-07-20 NOTE — PROGRESS NOTES
Physical Therapy  Physical Therapy  Movement Disorder Treatment Note    Date: 10/9/2017   Visit #: 4/11 - MEDICARE  Rx Units: 3 - TE, 1- NR   Total OP Minutes: 55    Pain:  No    Subjective:  No complaints this date    Objective:  Therapeutic Exercise:  Total Minutes: 40  Sustained Movements (sitting)  4reps  x 1-2 sets      Exercise   Reps   Sets   Effort     1A Floor to Ceiling Without Flicking     4 1 9     1B Side to Side Without Flicking     4 1 9      Comments: 1A - Minor VC for BIG posture.  Pt had loud voice this date  1B - 25% shaping for pelvic rotation and palm up.     Other:   -NuStep x 8 minutes Level #5 Ave METs: 3.0  SPM: 94  -Bridging x 10 reps with 3 sec holds - VC for proper form initially.   -Side lying clams x 15 reps B - initial TC and VC for proper form  -Side stepping along counter (15') with peach theraband around ankles x 3 reps each direction - VC for increased step height and leg alignment  -Squats x 15 reps x 2 sets with peach theraband above knees - CGA for balance, overall good form  -Walking forward/backwards with peach theraband above knees x 15' x 2 reps each  - CGA for balance. VC to keep feet apart and band tight.   -Pt ambulated 120' without AD x 2 reps - VC for upright posture. Pt demo'd slow speed, increased lateral sway and decreased step length with first walk. 2nd walk: pt demo'd improved upright posture, very minimal sway and increased step length and speed.         Neuro-Reeducation/Balance:  Total Minutes: 15  Multidirectional Repetitive Movements.  8-16reps  x 1-2 sets    Step and Reach:   UE support: 2A, B  Chairs Nearby: 2A, B      Exercise   Reps   Sets   Effort     2A Forward Step and Reach   12 2 each 9     2B Sideward Step and Reach   12 2 9     2C Backward Step and Reach        Comments: 2A - VC for deliberate step back to center and STOMP L>R.  2B - VC for increased power and amplitude of steps R>L    Rock and Reach:  UE support:   Chairs Nearby:       Exercise    Reps   Sets   Effort     2D Left leg Forward/Backward          2D Right leg Forward/Backward        2E Side to Side        Comments:     Other:           Gait Training:   Total Minutes:     Time Speed (MPH) UE support Direction Incline Comments                                      Other:     Therapeutic Activity  Total Minutes:      Functional Movement  Rep  Sets  Effort    Rolling        Supine to Sit       Sit to Stand from standard chair       Sit to Stand from low, soft chair       Sit and Reach       Walk and Turn       PWR Moves:       PWR Moves:        Comments:     Big Walking:     Other:     Carryover Assignment:     Assessment/Plan for week:  10/9/2017-10/13/2017  Patient tolerated additional proximal mm strengthening exercises this date.  Additionally she has been requiring less seated rest breaks. After today's session, she demonstrated improved posture and gait mechanics upon walking out of session.  Plan to continue working on balance activities and proximal mm strengthening in order to help with functional stability and gait.

## 2021-07-20 NOTE — PROGRESS NOTES
Physical Therapy  Physical Therapy  Movement Disorders Discharge Summary    Medical Diagnosis: Parkinson's Disease    Treatment Diagnosis: Bradykinesia, postural instability, lower extremity weakness    Referring MD:Natanael Lobato  Onset Date: 9/8/2017  Start of Care: 9/11/2017    Time of Evaluation: 1400  Time of last PD med: 0700  Time of next PD med: 1530    Transitional Movements   Initial Post Treatment Goals      Sit ? Stand  Mod I  Mod I (UE support with chair)    TUG with Cognitive task (sec) 19.47 13.90 < 14 - MET   TUG  (sec) 13.69 11.29 < 12 - MET   Strength  Initial Post Treatment Goals   30sec Chair Stand (#) w UE A 9 13 < 12 - MET   Balance  Initial Post Treatment Goals   Mini- BESTest     12/28 16 /28 16 /28 - MET    Motor Planning/Coordination Initial Post Treatment Goals   Four Square Step Test 26.62 19.7 < 21 - MET   (best of 2 trials)      Gait Initial Post Treatment Goals   Gait Speed in 6m (sec)- comfortable pace 5.93 5.98    Meters/seconds 1.01 1.00    # steps in 6m 11 11        Functional Outcome Changes: Pt has shown improvements in balance as measured by a 4 point increase in the miniBEST balance test, improvements in strength as measured by an increase in 4 stands in 30 seconds, and improvements in coordination and motor planning as measured by a decrease in 6.92 seconds in the Four Square Step Test and successful performance of the test without hitting the dowels.     Evaluation: Goals Met? Yes Comments: Pt has received maximum benefit of PT services at this time and was educated today on the usage of a cane per request in case she needs to use it for longer walks. Plan to re-evaluate in 6 months. Pt is agreeable to the discharge plan.      Patient seen from 9/11/2017 to 11/3/2017 for 11 treatment sessions.  Recommendation: D/C      Physician Recommendation:  1. I certify the need for these services furnished within this plan and while under my care. I agree with the therapist's  recommendation for plan of care.    2. If there is any recommendation for modification of therapy plan, please indicate below.    Kylie Broderick SPT in direct supervision of PT with PT directing treatment and plan of care. Eron Cam, PT, DPT, NCS.    Physician's Signature (Printed):  _____________________________

## 2021-07-20 NOTE — PROGRESS NOTES
Physical Therapy  Physical Therapy  Movement Disorder Treatment Note    Date: 10/16/2017   Visit #: 6/11 - MEDICARE  Rx Units: 2 - TE, 2- NR   Total OP Minutes: 55    Pain:  No    Subjective:  No complaints this date.  Reports compliance to her HEP.    Objective:  Therapeutic Exercise:  Total Minutes: 30  Sustained Movements (sitting)  4reps  x 1-2 sets      Exercise   Reps   Sets   Effort     1A Floor to Ceiling Without Flicking        1B Side to Side Without Flicking         Comments:    Other:   -NuStep x 10 minutes Level #5 Ave METs: 2.9  SPM: 94    -Side stepping with orange theraband around ankles x 15' x 3 reps in each direction with B UE support  - VC for increased step height and leg alignment.    -Pt ambulated x 500' - VC for upright gaze and posture in order to assist with swing phase clearance.       Neuro-Reeducation/Balance:  Total Minutes: 25  Multidirectional Repetitive Movements.  8-16reps  x 1-2 sets    Step and Reach:   UE support: 2A, B  Chairs Nearby: 2A, B      Exercise   Reps   Sets   Effort     2A Forward Step and Reach        2B Sideward Step and Reach        2C Backward Step and Reach        Comments:     Rock and Reach:  UE support:   Chairs Nearby:       Exercise   Reps   Sets   Effort     2D Left leg Forward/Backward          2D Right leg Forward/Backward        2E Side to Side        Comments:     Other:   Balance Assessment: Mini-BEST 15/28 (<23/28)   1) Sit <> Stand: (1) Moderate   2) Rise to Toes: (0) Severe 2 seconds max   3) Stand on One Leg: (0) Severe L: unable R: unable   4) Compensatory Stepping Correction-Forward: (2) Normal   5) Compensatory Stepping Correction-Backward: (2) Normal   6) Compensatory Stepping Correction-Lateral: (1) Moderate L: (2) Normal R: (1) Moderate   7) Eyes Open, Firm Surface: (2) Normal  >30 seconds  8) Eyes Closed, Foam Surface: (1) Moderate 8 seconds   9) Incline, Eyes Closed: (1) Moderate  >30 seconds, but aligns with surface   10) Change in Gait  Speed: (1) Moderate   11) Walk with Head Turns-Horizontal: (1) Moderate*  12) Walk with Pivot Turns: (1) Moderate 6 steps  13) Step Over Obstacles: (1) Moderate  Touches boxes   14) TUG with Dual Tasks: (1) Moderate  TU.84 seconds  Cognitive TUG:  15.25 seconds Task: counting backwards by 3s from 100 (100-70 correctly)    Pt educated on scores from initial evaluation compared to reassessment. Education also provided on goals and POC. All questions addressed.         Gait Training:   Total Minutes:     Time Speed (MPH) UE support Direction Incline Comments                                      Other:     Therapeutic Activity  Total Minutes:      Functional Movement  Rep  Sets  Effort    Rolling        Supine to Sit       Sit to Stand from standard chair       Sit to Stand from low, soft chair       Sit and Reach       Walk and Turn       PWR Moves:       PWR Moves:        Comments:     Big Walking:     Other:     Carryover Assignment:     Assessment/Plan for week:  10/16/2017-10/20/2017  Reassessment of mini BEST test completed this date. Patient improved from 12 on initial evaluation to 15/28 on the mini BEST test this date.  She continues to have difficulty with balance on her toes, single leg stance and balance confidence with her eyes closed.  Additionally, she demonstrates bradykinesia with dynamic balance activities.  Patient appears to have made progress with the strengthening focus during the first half of therapy and will plan to increase balance activities especially with dynamic activities.  Additionally, will assess pt's gait quality on the treadmill next session.   G-codes updated per medicare guidelines.

## 2021-07-20 NOTE — PROGRESS NOTES
"Physical Therapy  Physical Therapy  Movement Disorder Treatment Note    MEDICARE    Date: 10/23/2017   Visit #: 8/11 - reassessed on 10/16  Rx Units: 2- TE, 2- NR    Total OP Minutes: 55    Pain:  No       Subjective:  Pt reports that she has had increased tanner LE swelling and was placed on a \"water pill\".     Objective:  Therapeutic Exercise:  Total Minutes: 25  Sustained Movements (sitting)  4reps  x 1-2 sets      Exercise   Reps   Sets   Effort     1A Floor to Ceiling Without Flicking    4 1 9     1B Side to Side Without Flicking    4 1 9      Comments: 1A - VC for BIG posture and eyes up.  1B - Initial shaping for trunk rotation and big UE reach.  Pt lacks hip and knee extension despite being on elevated mat.     Other:   -NuStep x 8 minutes Level #5 Ave METs: 2.7  SPM: 89    -BIG walking x 500' with cues for eyes up and to keep feet apart when walking.  Cues to reach for heel strike to encourage improved foot clearance.  PT added horizontal head turns to further challenge pt's balance and gait stability.  Pt lacks cervical ROM to the left and needs verbal encouragement to increase ROM.  Mildly unsteady with head turns but no LOB; pt does demo mild path deviation and changes in smooth gait quality.    Neuro-Reeducation/Balance:  Total Minutes: 30  Multidirectional Repetitive Movements.  8-16reps  x 1-2 sets    Step and Reach:   UE support: 2A  Chairs Nearby: 2A, B      Exercise   Reps   Sets   Effort     2A Forward Step and Reach 12 2 each 9     2B Sideward Step and Reach 12 2 9     2C Backward Step and Reach        Comments: 2A - VC for BIG step back to center and strong arm.  Additional cues for BIG posture and upright gaze  2B - No UE support this date to further challenge pt's balance.  Cues for head turns.      Rock and Reach:  UE support: 2D  Chairs Nearby: 2D      Exercise   Reps   Sets   Effort     2D Left leg Forward/Backward   20  2 9     2D Right leg Forward/Backward   20 2 9     2E Side to Side    "     Comments: 2D - VC for BIG toe lift as well as big heel raise, upright posture and gaze.     Other:      On Trivop foam pad:   -Ball toss with unweighted ball and progressed to weighted ball (2.2#) x 3 minutes   -Romberg position, EO    -initial max cues for anterior wt shift to avoid post LOB    -Pt demo'd initial several post LOB and relied on mat for balance checks   -Normal GRACE    -EC x10 sec with progressing narrow GRACE x 10 sec bouts; pt unable to achieve Romberg position and EC this date.      Gait Training:   Total Minutes:    Time Speed (MPH) UE support Direction Incline Comments                                            Other:     Therapeutic Activity  Total Minutes:      Functional Movement  Rep  Sets  Effort    Rolling        Supine to Sit       Sit to Stand from standard chair       Sit to Stand from low, soft chair       Sit and Reach       Walk and Turn       PWR Moves:       PWR Moves:        Comments:     Big Walking:     Other:     Carryover Assignment:     Assessment/Plan for week:  10/23/2017-10/27/2017  Plan to progress balance exercises next Rx session to include foam and river rock activities as well as a basic obstacle course. See next Rx session for weekly AP.

## 2021-07-20 NOTE — PROGRESS NOTES
"Physical Therapy  Physical Therapy  Movement Disorder Treatment Note    Date: 10/11/2017   Visit #: 5/11 - MEDICARE  Rx Units: 3 - TE, 1- NR   Total OP Minutes: 55    Pain:  No    Subjective:  No complaints this date.  Pt came to PT without an AD this date.    Objective:  Therapeutic Exercise:  Total Minutes: 40  Sustained Movements (sitting)  4reps  x 1-2 sets      Exercise   Reps   Sets   Effort     1A Floor to Ceiling Without Flicking     4 1 \"My best\"     1B Side to Side Without Flicking     4 1 \"My best\"      Comments: 1A - Minor VC for BIG posture.     1B - 25% initial shaping for pelvic rotation; cues for big UE reach.     Other:   -NuStep x 10 minutes Level #5 Ave METs: 3.1  SPM: 96    -Bridging x 15 reps with 3 sec holds - VC for proper form initially.   -Side lying clams x 15 reps B - overall good form with slight cues to avoid pelvis from rolling backwards  -Side stepping with peach theraband around ankles x 30' in each direction - VC for increased step height and leg alignment.  Cues to keep feet apart and band tight.  -Wall Squats x 10 reps x 2 sets with ball between knee; cues for a slightly deeper squat but to avoid knees over toes.    -Pt ambulated x 150' and 1 x 300' - VC for upright posture and to engage glutes during stance phase.  Pt able to demo improved upright posture, very minimal sway and increased step length and speed but began to fatigue with last 50' of 2nd bout.      Neuro-Reeducation/Balance:  Total Minutes: 15  Multidirectional Repetitive Movements.  8-16reps  x 1-2 sets    Step and Reach:   UE support: 2A, B  Chairs Nearby: 2A, B      Exercise   Reps   Sets   Effort     2A Forward Step and Reach   12 2 each, 1 alt 9     2B Sideward Step and Reach   12 2 9     2C Backward Step and Reach        Comments: 2A - VC for deliberate step back to center and STOMP L>R; 3rd set without UE assist and alternating LE-cues to increase R LE step length.  Mildly unsteady without UE assist; reduced " amplitude but no LOB.  2B - VC for increased power and amplitude of steps R>L    Rock and Reach:  UE support:   Chairs Nearby:       Exercise   Reps   Sets   Effort     2D Left leg Forward/Backward          2D Right leg Forward/Backward        2E Side to Side        Comments:     Other:           Gait Training:   Total Minutes:     Time Speed (MPH) UE support Direction Incline Comments                                      Other:     Therapeutic Activity  Total Minutes:      Functional Movement  Rep  Sets  Effort    Rolling        Supine to Sit       Sit to Stand from standard chair       Sit to Stand from low, soft chair       Sit and Reach       Walk and Turn       PWR Moves:       PWR Moves:        Comments:     Big Walking:     Other:     Carryover Assignment:     Assessment/Plan for week:  10/9/2017-10/13/2017  See previous Rx session note for weekly AP.

## 2021-07-20 NOTE — PROGRESS NOTES
Physical Therapy  Physical Therapy  Movement Disorder Treatment Note     MEDICARE    Date: 2/14/2017   Visit #: 10/11 - (12/19/2016  to 3/14/2017) Reassessment completed 1/30/2017  Rx Units: 1- TE, 3- NR   Total OP Minutes: 60    Pain:  No    Subjective:  Pt reports compliance to HEP.  Since beginning therapy, she reports more confidence in her balance.      Objective:  Therapeutic Exercise:  Total Minutes: 20  Sustained Movements (sitting)  4reps  x 1-2 sets      Exercise   Reps   Sets   Effort     1A Floor to Ceiling Without Flicking   6 1 10     1B Side to Side Without Flicking   6 1 10      Comments: 1A - Good posture this date. Minor cues for full elbow extension.   1B - Shaping for increased knee extension and pelvic rotation.   Good posture and palm up.     Other:      -NuStep x 5 minutes  Level #5 Ave METS: 3.0   SPM: 98  -Pt educated on use and benefits of NuStep and goal for METS between 3.0-4.0.  All questions addressed.  -Heel raises x 10 reps x 2 sets - holding for 5 seconds during last rep without UE support.        Neuro-Reeducation/Balance:  Total Minutes: 40  Multidirectional Repetitive Movements.  8-16reps  x 1-2 sets    Step and Reach:   UE support: 2A, 2B, 2C  Chairs Nearby: 2A, 2B, 2C      Exercise   Reps   Sets   Effort     2A Forward Step and Reach    12  2 each 10     2B Sideward Step and Reach    12     2 10     2C Backward Step and Reach    12 1 each 9.5     Comments: 2A - VC for deliberate step height upon return to center B and BIG arm especially as reps progressed this date. Improved amplitude with second set.    2B - VC for BIG arms and hands in addition to maintaining BIG energy.   2C - No alternating. VC for deliberate step to center as well as BIG toe lift.       Rock and Reach:  UE support: 2D  Chairs Nearby: 2D      Exercise   Reps   Sets   Effort     2D Left leg Forward/Backward 20 2 10     2D Right leg Forward/Backward 20 2 10     2E Side to Side        Comments: 2D- VC for BIG  posture and upright gaze.  Additional cues for BIG rocking when attention was focused on posture.        Other:    SLS:  L x 5 second max x 10 trials.   R x 4 seconds max x 10 trials - VC for weight shift to stance leg and avoid resting leg on stance limb    Green foam in romberg stance:   Eyes open x 60 seconds - VC for anterior weight shift and close SBA   Horizontal head turns x 10 reps - increased instability and decreased ROM of neck observed.  CGA for safety   Eyes closed x 10 seconds max x 8 reps - VC for anterior weight shift and equal weight bearing L/R. Very close SBA to Perico for balance recovery.     Incline surface close SBA to CGA:   Eyes open x 60 seconds - VC for anterior weight shift   Vertical head turns x 10 reps - mild posterior sway with looking up   Eyes closed x 22 seconds, 30 seconds - VC for encouragement as pt demonstrates fear of falling   Mini squats x 10 reps - increased posterior sway however no LOB. CGA for safety.             Gait Training:   Total Minutes:     Time Speed (MPH) UE support Direction Incline Comments                                      Other:     Therapeutic Activity  Total Minutes:      Functional Movement  Rep  Sets  Effort    Rolling        Supine to Sit       Sit to Stand from standard chair       Sit to Stand from low, soft chair       Sit and Reach       Walk and Turn       PWR Moves:       PWR Moves:        Comments:     Big Walking:      Other:      Carryover Assignment: issued 1A, 1B, 2A, 2B, 2C, 2D to HEP    Assessment/Plan for week:  2/13/2017-2/17/2017  Plan to reassess goals next session.

## 2021-07-20 NOTE — PROGRESS NOTES
Physical Therapy  Physical Therapy  Movement Disorder Treatment Note     MEDICARE     Date: 10/30/2017   Visit #: 11 - reassessed on 10/16  Rx Units: 1 - TE, 2 - NR, 1- Gait  Total OP Minutes: 55     Pain:  No      Subjective:  Pt reports a little achiness in knees and fatigue, but no pain. Took Sinemet at 7am, plans to take the next at 3:30p.      Objective:  Therapeutic Exercise:  Total Minutes: 15    -NuStep x 10 minutes,                      Workload  #6             Ave METs: 3.0                                           SPM: 94     - 30 second Chair Stand w UE A from armed chair: 13 reps     Neuro-Reeducation/Balance:  Total Minutes: 30    TUG:  Trial 1: 11.67, 10.91 seconds    [id number], count backwards by 3s from 30. 30-19 correct.    Balance Assessment: Mini-BEST  (<)   1) Sit <> Stand: (1) moderate, uses hands   2) Rise to Toes: (0) severe, 2 seconds   3) Stand on One Leg: (1) moderate L: 3 seconds R: 3 seconds   4) Compensatory Stepping Correction-Forward: (2) normal   5) Compensatory Stepping Correction-Backward: (1) moderate   6) Compensatory Stepping Correction-Lateral: (1) moderate L: (1) 2 steps R: (2) posterior crossover + lateral step   7) Eyes Open, Firm Surface: (2) normal   8) Eyes Closed, Foam Surface: (1) 5 seconds   9) Incline, Eyes Closed: (1)  5 seconds   10) Change in Gait Speed: (1) moderate, imbalance  11) Walk with Head Turns-Horizontal: (1) moderate  12) Walk with Pivot Turns: (1) moderate, slow turn  13) Step Over Obstacles: (2) normal   14) TUG with Dual Tasks: (1) dual task affects walking TU.29 Cognitive TUG: 10.91  Four Square Step Test  Trial 1: 23.30 seconds  Trial 2: 19.7 seconds   Best trial: 19.7 seconds  Comments: required Perico for LOB on first trial. Fearful of falling. Did not hit dowels.     Gait Training  Total Minutes: 10    Gait Speed:  Preferred Gait Speed: 5.98  # of steps: 11  Speed (m/s): 1.00    Fast Gait Speed: 4.76  # of steps: 9  Speed  (m/s): 1.26    Pt educated on use of SEC and proper gait pattern. Therapist showed pt how to determine height of SEC and pattern. Pt then ambulated 80' x 2 with use of SEC and mod I. VC for proper pattern initially and maintain increased step length with cane.      Assessment/Plan for week:  10/30/2017-11/3/2017  See d/c summary.

## 2021-07-20 NOTE — PROGRESS NOTES
"Physical Therapy  Physical Therapy  Movement Disorders  Medicare Certification    Medical Diagnosis: Parkinson's Disease    Treatment Diagnosis: bradykinesia, postural instability, lower extremity weakness    Referring MD: Natanael Lobato  Onset Date: 9/8/2017  Start of Care: 9/11/2017    Assessment: Patient is a 87 yo female with Parkinson's Disease x 1 year who presents with complaints of instability and difficulty with mobility. Fortunately she has not sustained a fall in the past year however did score high fall risk on the mini BEST test (12/28). Patient also scored high fall risk on the Timed up and go and 4 square step test and demonstrated difficulty with divided attention tasks.  She scored below age/gender norms on the 30\" Chair Stand and 6 Minute Walk test indicated lower extremity weakness and decreased activity tolerance. Patient would benefit from skilled 1:1 PT in order to address deficits and optimize function.     Prognostic Indicators: Rehabilitation Potential Good and based on motivation, family support, and good response to prior therapy  Impairment: Acitivity Tolerance, Balance, Bradykinesia/Hypokinesia, Motor Function, Postural/Gait Instability and Sensory Function    Functional Goals to be met by 11 visits  Patient will show improved efficiency and quality of movement, improved gait and postural stability for increased safety, decreased fall risk when performing ADL's, IADL's, household &/or community ambulation as measured by:                           TUG </= 12 seconds,                         Cog/Motor TUG </= 14 seconds,                         30 Second Chair Stand >/= 12 reps with UE A,                         Balance Assessment: mini BEST test 16/28,                         Four Square Step Test </= 21 seconds    Patient will be mod I/SBA with HEP  Patient Functional Goal: improve balance and strength  Goals were established with the patient: yes    Plan of Care  Communication with: " referral Source, patient and treatment Team, Patient / Family / Instruction: Etiology of diagnosis or presented symtoms, Treatment plan/rationale, Home Exercise Program and Expected Functional Outcomes, Big/PWR Techniques, Therapeutic Exercise, Mobility / Transfer Training, Gait Training, Neuro Re-ed / Balance and Manual therapy    Frequency/Duration 2x/week for up to 11 visits including evaluation    MEDICARE PATIENTS:  [id number]  Provider #   Certification Dates: from 9/11/2017 to 12/10/2017        Physician Recommendation:  1. I certify the need for these services furnished within this plan and while under my care. I agree with the therapist's recommendation for plan of care.    2. If there is any recommendation for modification of therapy plan, please indicate below.      Physician's Signature (Printed):  _____________________________

## 2021-07-20 NOTE — PROGRESS NOTES
Physical Therapy  Physical Therapy  Movement Disorder Treatment Note     MEDICARE    Date: 2/2/2017   Visit #: 7/11 - (12/19/2016  to 3/14/2017) Reassessment completed 1/30/2017  Rx Units: 1- TE, 3- NR   Total OP Minutes: 60    Pain:  No    Subjective:  No complaints. Patient reports compliance to HEP on a daily basis.     Objective:  Therapeutic Exercise:  Total Minutes: 20  Sustained Movements (sitting)  4reps  x 1-2 sets      Exercise   Reps   Sets   Effort     1A Floor to Ceiling Without Flicking   6 1 10     1B Side to Side Without Flicking   6 1 10      Comments: 1A - Good posture this date. VC for BIG R hand.   1B - 25% shaping for trunk/pelvic rotation with first two reps only. VC for alternating sides    Other:      -NuStep x 5 minutes  Level #5 Ave METS: 2.9   SPM: 97   -BIG walking x 400' - VC for upright posture and deliberate heel strike.        Neuro-Reeducation/Balance:  Total Minutes: 40  Multidirectional Repetitive Movements.  8-16reps  x 1-2 sets    Step and Reach:   UE support: 2A, 2B, 2C  Chairs Nearby: 2A, 2B      Exercise   Reps   Sets   Effort     2A Forward Step and Reach    12  2 each 10     2B Sideward Step and Reach    12     2 10     2C Backward Step and Reach    12 1 each 9.5     Comments: 2A - VC for UE coordination and deliberate step height upon return to center.  Improved amplitude with second set.   2B - First set without UE support leading to decreased amplitude. 2nd set with UE support: Improved step height with second set.   2C - No alternating. Pt demonstrated minimal foot clearance and too BIG of step on R LE. VC for BIG toe lift and wider GRACE. Improved coordination this date.       Rock and Reach:  UE support: 2D  Chairs Nearby: 2D      Exercise   Reps   Sets   Effort     2D Left leg Forward/Backward 20 1 10     2D Right leg Forward/Backward 20 1 10     2E Side to Side        Comments: 2D- Initial VC for appropriate GRACE. VC for BIG posture and upright gaze.       Other:  Stepping over canes and dowels x 15' - CGA to Perico for balance   -Forward x 4 reps - VC for increased step length and BIG heel strike   -Side stepping x 2 reps each direction with B UE support - VC for BIG step    -Backwards x 2 reps - UE support and CGA to Perico for balance. VC for height of obstacle and increased step length        Gait Training:   Total Minutes:     Time Speed (MPH) UE support Direction Incline Comments                                      Other:     Therapeutic Activity  Total Minutes:      Functional Movement  Rep  Sets  Effort    Rolling        Supine to Sit       Sit to Stand from standard chair       Sit to Stand from low, soft chair       Sit and Reach       Walk and Turn       PWR Moves:       PWR Moves:        Comments:     Big Walking:     Other:      Carryover Assignment: issued 1A, 1B, 2A, 2B, 2C to HEP    Assessment/Plan for week:  1/30/2017-2/3/2017  See previous note for this week's A/P.

## 2021-07-20 NOTE — PROGRESS NOTES
Physical Therapy  Physical Therapy  Movement Disorder Treatment Note    Date: 10/2/2017   Visit #:  - MEDICARE  Rx Units: 2 - TE, 2- NR  Total OP Minutes: 55    Pain:  Yes  Location: hip and knees, Ratin/10, Intervention: rest prn    Subjective:  No concerns or falls since initial evaluation.     Objective:  Therapeutic Exercise:  Total Minutes: 25  Sustained Movements (sitting)  4reps  x 1-2 sets      Exercise   Reps   Sets   Effort     1A Floor to Ceiling Without Flicking     4 1 7     1B Side to Side Without Flicking     4 1 7      Comments: 1A - VC for BIG posture.  Pt able to self correct as reps progressed.   1B - 50% shaping for pelvic rotation and palm up.     Other:   NuStep x 8 minutes Level #5 Ave METs: 3.0  SPM: 93  -sit to stand x 10 reps - VC for BIG reach forward.   -BIG walking x 500' with AD with close SBA. VC for upright posture  -standing gastroc stretch x 30 seconds x 2 reps each side - TC and VC for proper form      Neuro-Reeducation/Balance:  Total Minutes: 30  Multidirectional Repetitive Movements.  8-16reps  x 1-2 sets    Step and Reach:   UE support: 2A, B, C  Chairs Nearby: 2A, B, C      Exercise   Reps   Sets   Effort     2A Forward Step and Reach   10 2 each 7     2B Sideward Step and Reach   10 2 9     2C Backward Step and Reach        Comments: 2A - VC for deliberate step back to center and STOMP. Decreased coordination of arm reach with step.   2B - VC for increased power and amplitude of steps.     Rock and Reach:  UE support:   Chairs Nearby:       Exercise   Reps   Sets   Effort     2D Left leg Forward/Backward          2D Right leg Forward/Backward        2E Side to Side        Comments:   Other:   Balance activities:   Romberg on red foam:    Eyes open x 20 seconds x 8 reps - VC for anterior weight shift. Perico to CGA for balance   Normal stance on red foam:    Static standing x 30 seconds    Perturbations to trunk x 30 seconds - intermittent UE support on nearby counter      Mini squats x 10 reps - CGA for balance. Intermittent UE support on nearby counter.     Marching in place x 10 reps with single UE support. - anterior translation on disc. CGA to Perico for balance    Dynamic Balance Activities with close SBA:   Walking with speed changes x 150 feet x 2 reps - unsteadiness with slow speed   Walking with horizontal head turns x 50 feet x 1 reps - very minimal ROM, decreased speed observed   Walking with 180 degree turns x 10 reps - VC for deliberate steps and wide turn.           Gait Training:   Total Minutes:     Time Speed (MPH) UE support Direction Incline Comments                                      Other:     Therapeutic Activity  Total Minutes:      Functional Movement  Rep  Sets  Effort    Rolling        Supine to Sit       Sit to Stand from standard chair       Sit to Stand from low, soft chair       Sit and Reach       Walk and Turn       PWR Moves:       PWR Moves:        Comments:     Big Walking:     Other:     Carryover Assignment:     Assessment/Plan for week:  10/2/2017-10/6/2017  Patient returned for first subsequent treatment sessions after initial evaluation.  She demonstrates bradykinesia and unsteadiness with BIG exercises which leads to increased UE support for balance.  Patient was unsteady and lacked confidence on foam surface therefore will trial a lower level next session.  Plan to focus on LE strengthening and balance in order to assist with bradykinesia management.

## 2021-07-20 NOTE — PROGRESS NOTES
Physical Therapy  Physical Therapy  Movement Disorder Treatment Note    MEDICARE    Date: 10/30/2017   Visit #: 10/11 - reassessed on 10/16  Rx Units: 1 - TE, 3 - NR  Total OP Minutes: 60    Pain:  No       Subjective:  Pt states she called her PCP after last visit and they increased her BP meds. States she isn't having headaches anymore.     Objective:  Therapeutic Exercise:  Total Minutes: 20  Sustained Movements (sitting)  4reps  x 1-2 sets      Exercise   Reps   Sets   Effort     1A Floor to Ceiling Without Flicking    4 1 9     1B Side to Side Without Flicking    4 1 9      Comments: 1A - minimal cues, initial cue for upright posture and palms forward   1B - Cues to extend back leg and tall posture     Other:   -Baseline BP- 140/67, HR-76 bpm  -NuStep x 10 minutes,  Workload  #6 Ave METs: 2.9  SPM: 85        Neuro-Reeducation/Balance:  Total Minutes: 40  Multidirectional Repetitive Movements.  8-16reps  x 1-2 sets    Step and Reach:   UE support: 2A, 2C  Chairs Nearby: 2A, B, C      Exercise   Reps   Sets   Effort     2A Forward Step and Reach 10 2 each 9     2B Sideward Step and Reach 10 2 9     2C Backward Step and Reach 10 1 each 9     Comments: 2A - Cue to increase step height.  Improved step height during second set  2B -  Cues to take large steps, pt minimally turning head with movement.   2C -  Cues to lift toes when bowing back and to take large step and stomp.      Rock and Reach:  UE support: 2D  Chairs Nearby: 2D      Exercise   Reps   Sets   Effort     2D Left leg Forward/Backward   20  2 10     2D Right leg Forward/Backward   20 2 10     2E Side to Side        Comments: 2D - VC for BIG toe lift as well as big heel raise, upright posture.     Other:    Dynamic Balance Activities with SBA:   Walking with speed changes x 60 feet x 3 reps - VC for more significant change in speed with slow speed   Walking with horizontal head turns x 60 feet x 4 reps - path deviations to direction of head turn and slow  speed.    Walking with 180 degree turns x 8 reps - VC for increased step height and deliberate steps as well as BIG posture.  Pt required 4-5 steps for turn   Walking with divided attention x 500 feet - cognitive task of naming children and grandchildren as well as 5 items that begin with B and M.  - VC for BIG posture, maintaining speed and upright gaze.          Gait Training:   Total Minutes:    Time Speed (MPH) UE support Direction Incline Comments                                            Other:     Therapeutic Activity  Total Minutes:      Functional Movement  Rep  Sets  Effort    Rolling        Supine to Sit       Sit to Stand from standard chair       Sit to Stand from low, soft chair       Sit and Reach       Walk and Turn       PWR Moves:       PWR Moves:        Comments:     Big Walking:     Other:     Carryover Assignment:     Assessment/Plan for week:  10/30/2017-11/3/2017  Pt progressing with balance activities evident by increase in ability to perform some Big exercises with decreases in UE support but still requires cues throughout for improved amplitude.   Pt continues to be challenged with dual tasking and head turns while ambulating which cause decrease in gait speed, flexion of the trunk and neck and lateral deviations in gait path.  Plan to d/c pt after next visit, reassess goals and testing as appropriate.

## 2021-07-20 NOTE — PROGRESS NOTES
Physical Therapy  Physical Therapy  Movement Disorder Treatment Note     MEDICARE    Date: 1/20/2017   Visit #: 3/11 - (12/19/2016  to 3/14/2017)  Rx Units: 2- TE, 2- NR  Total OP Minutes: 55    Pain:  No    Subjective:  Pt reports compliance with her HEP.  No significant complaints this date.    Objective:  Therapeutic Exercise:  Total Minutes: 30  Sustained Movements (sitting)  4reps  x 1-2 sets      Exercise   Reps   Sets   Effort     1A Floor to Ceiling Without Flicking   6 1 9     1B Side to Side Without Flicking   4 1 each 9      Comments: 1A - VC for BIG posture and midline during exercise and between reps.  1B - no alternating. 25-50% shaping for trunk rotation and BIG arm. VC for palm up    Other:   -Patient re-instructed in effort scale in relation to BIG movements. Education also provided on large amplitude (BIG) theory and carry over to everyday tasks.   -NuStep x 8 minutes  workload #5 Ave METS: 3.2  SPM: 87    -**Bridging x 20 reps with UE support and DF to assist with cramping in hamstrings.  Minimal hip raise; cues to raise as high as she could.  -**Standing mini squats with light UE support x 20 reps.  Minimal cues for correct form; pt tolerated well.    -BIG walking x 500' with cues for tall posture (pt demos upper trunk lean to the left) and to reach for heelstrike.  Pt demos good step length and appropriate pace with verbal encouragement.      Neuro-Reeducation/Balance:  Total Minutes: 25  Multidirectional Repetitive Movements.  8-16reps  x 1-2 sets    Step and Reach:   UE support: 2A, 2B  Chairs Nearby: 2A, 2B      Exercise   Reps   Sets   Effort     2A Forward Step and Reach    10 3 total 8     2B Sideward Step and Reach    10     2 6     2C Backward Step and Reach        Comments: 2A - VC for BIG, deliberate step back to center and cues to put weight forward onto stepping leg. 1st 2 sets with pt holding on.  3rd set without UE support and alternating LE; pt demos gross instability and  reduction in amplitude due to poor balance.  Continue future sessions with UE support.  2B - 2nd set without UE support.  Pt tends to drag L LE--cues for high deliberate step.  Additional cues to turn head and count loud.    Rock and Reach:  UE support: 2D  Chairs Nearby:       Exercise   Reps   Sets   Effort     2D Left leg Forward/Backward 20 1 9     2D Right leg Forward/Backward 20 1 9     2E Side to Side        Comments: 2D-Pt reported that she has difficulty with coordination.  There was mild difficulty coordinating rocking with single UE reach but improved with reps.  Cues for greater UE amplitude.  Other:        Gait Training:   Total Minutes:     Time Speed (MPH) UE support Direction Incline Comments                                      Other:     Therapeutic Activity  Total Minutes:      Functional Movement  Rep  Sets  Effort    Rolling        Supine to Sit       Sit to Stand from standard chair       Sit to Stand from low, soft chair       Sit and Reach       Walk and Turn       PWR Moves:       PWR Moves:        Comments:     Big Walking:     Other:      Carryover Assignment: issued 1A, 1B, 2A, 2B to HEP    Assessment/Plan for week:  1/16/2016-1/20/2016  Pt only had 1 visit this week.  Pt is a regular at the  and reports working out 3x/wk.  **'d exercises added to pt's HEP.  Continue to progress proximal strengthening to assist with balance.  Begin to add basic obstacle courses to improve balance and confidence.  Continue with BIG exercises.

## 2021-07-20 NOTE — PROGRESS NOTES
Physical Therapy  Physical Therapy  Movement Disorder Treatment Note     MEDICARE    Date: 2/9/2017   Visit #: 9/11 - (12/19/2016  to 3/14/2017) Reassessment completed 1/30/2017  Rx Units: 1- TE, 3- NR  Total OP Minutes: 55     Pain:  No    Subjective:  Pt reports compliance to HEP.  Since beginning therapy, she has noticed improved posture and ease with getting out of chairs.     Objective:  Therapeutic Exercise:  Total Minutes: 15  Sustained Movements (sitting)  4reps  x 1-2 sets      Exercise   Reps   Sets   Effort     1A Floor to Ceiling Without Flicking   6 1 10     1B Side to Side Without Flicking   6 1 10      Comments: 1A - Good posture this date. Minor cues for BIG arms with last rep.   1B - Shaping for increased knee extension.   Good posture and palm up.     Other:      -NuStep x 5 minutes  Level #6-5 Ave METS: 3.2   SPM: 91       Neuro-Reeducation/Balance:  Total Minutes: 40  Multidirectional Repetitive Movements.  8-16reps  x 1-2 sets    Step and Reach:   UE support: 2A, 2B, 2C  Chairs Nearby: 2A, 2B      Exercise   Reps   Sets   Effort     2A Forward Step and Reach    12  2 each 10     2B Sideward Step and Reach    12     2 10     2C Backward Step and Reach    12 1 each 10     Comments: 2A - VC for deliberate step height upon return to center B.   2B - VC for head turns and maintain BIG energy.   2C - No alternating. VC for decreased step length and deliberate step to center as well as BIG toe lift.       Rock and Reach:  UE support: 2D  Chairs Nearby: 2D      Exercise   Reps   Sets   Effort     2D Left leg Forward/Backward 20 2 10     2D Right leg Forward/Backward 20 2 10     2E Side to Side        Comments: 2D- VC for BIG posture       Other:  Stepping over canes and dowels x 15' with HHA and CGA    Forward stepping x 4 reps - VC for increased step length and heel strike in order to clear obstacles   Side stepping x 2 reps each direction - VC for increased step length in order to allow both feet to  clear obstacles   Backwards x 4 reps - VC for increased step length    Walking and reaching for cones at different heights and distance x 11 cones - SBA for safety. VC to scanning environment vertically to locate all cones.         Gait Training:   Total Minutes:     Time Speed (MPH) UE support Direction Incline Comments                                      Other:     Therapeutic Activity  Total Minutes:      Functional Movement  Rep  Sets  Effort    Rolling        Supine to Sit       Sit to Stand from standard chair       Sit to Stand from low, soft chair       Sit and Reach       Walk and Turn       PWR Moves:       PWR Moves:        Comments:     Big Walking:      Other:      Carryover Assignment: issued 1A, 1B, 2A, 2B, 2C, 2D to HEP    Assessment/Plan for week:  2/6/2017-2/10/2017  See previous note for this week's A/P.

## 2021-07-20 NOTE — PROGRESS NOTES
"Physical Therapy  Physical Therapy  Movement Disorder Treatment Note     MEDICARE    Date: 1/26/2017   Visit #: 5/11 - (12/19/2016  to 3/14/2017)  Rx Units: 2- TE, 1 - NR, 1- TA  Total OP Minutes: 55    Pain:  No    Subjective:  No new complaints. She notes difficulty with 2C at home.     Objective:  Therapeutic Exercise:  Total Minutes: 25  Sustained Movements (sitting)  4reps  x 1-2 sets      Exercise   Reps   Sets   Effort     1A Floor to Ceiling Without Flicking   4 2 9.5-9.9     1B Side to Side Without Flicking   6 1 9      Comments: 1A - VC for BIG posture and BIG hands. Improved amplitude with second set.   1B - 25% shaping for trunk/pelvic rotation. VC for palm up    Other:      -NuStep x 8 minutes  workload #5 Ave METS: 2.9   SPM: 94  PRE: 3/10          Neuro-Reeducation/Balance:  Total Minutes: 20  Multidirectional Repetitive Movements.  8-16reps  x 1-2 sets    Step and Reach:   UE support: 2A, 2B, 2C  Chairs Nearby: 2A, 2B      Exercise   Reps   Sets   Effort     2A Forward Step and Reach    10  2 each 10     2B Sideward Step and Reach    10     2 9     2C Backward Step and Reach    12 1 each 10     Comments: 2A - No alternating. Decreased amplitude of step height especially with return to center. When pt focused on LEs, she lacked effort and coordination of UE.  VC for deliberate step back to center.  2nd set with dowel placed to encourage BIG steps.   2B - VC for deliberate step upon return to center. Improved amplitude with second set and cues to \"STOMP\" foot  2C - No alternating. Pt demonstrated minimal foot clearance and was unsteady. Heavy reliance on UE support. VC for appropriate step length, BIG toe lift and wider GRACE.       Rock and Reach:  UE support: 2D  Chairs Nearby: 2D      Exercise   Reps   Sets   Effort     2D Left leg Forward/Backward 20 2 10     2D Right leg Forward/Backward 20 2 10     2E Side to Side        Comments: 2D- Initial VC for appropriate GRACE. VC for BIG posture and effort " throughout each set.     Other:       Gait Training:   Total Minutes:     Time Speed (MPH) UE support Direction Incline Comments                                      Other:     Therapeutic Activity  Total Minutes: 10     Functional Movement  Rep  Sets  Effort    Rolling        Supine to Sit       Sit to Stand from standard chair 10 1 10    Sit to Stand from low, soft chair       Sit and Reach       Walk and Turn       PWR Moves:       PWR Moves:        Comments: sit to stand: VC for BIG reach forward and power up through legs.  Slow pace due to weakness. Close SBA to CGA for safety    Big Walking: x 700' - VC for BIG posture, upright gaze and BIG heel strike.     Other:      Carryover Assignment: issued 1A, 1B, 2A, 2B, 2C to HEP    Assessment/Plan for week:  1/23- 1/27/2017  See previous note for this week's A/P. Pt requires UE support for standing exercises otherwise her amplitude is compromised.

## 2021-07-20 NOTE — PROGRESS NOTES
"Physical Therapy  Physical Therapy  Movement Disorder Treatment Note     MEDICARE    Date: 2017   Visit #: 11/ - (2016  to 3/14/2017) Reassessment completed 2017  Rx Units: 1- TE, 3- NR    Total OP Minutes: 60    Pain:  No    Subjective:  Pt reports compliance to HEP.  Since beginning therapy, she reports more confidence in her balance,increased ease with transfers and improved walking.      Objective:  Therapeutic Exercise:  Total Minutes: 20     -NuStep x 5 minutes  Level #5 Ave METS: 3.2   SPM: 90    30\" Chair Stand: 12 reps  With UE support from armed chair  30\" Chair Stand: 9 reps  Without UE support    Preferred Gait Speed:   6 meters in 5.37 seconds  Meters/second: 1.12     # steps in 6 meters: 10   Assistive Device: none   Gait Analysis: improved step length,     Fast Gait Speed:   6 meters in 3.89 seconds Meters/second: 1.54   # steps in 6 meters: 9   Assistive Device: none    6 minute walk test: 1372 feet  RPD: 1/10       Neuro-Reeducation/Balance:  Total Minutes: 40    360 degree turn: 8 steps CW and CCW    Four Square Step Test Trial 1: 21.09  Trial 2: 17  >15 sec: Falls Risk   seconds (best of 2 trials)  - 17 seconds Comments: pt struck dowel during first trial    TUG(Timed Up and Go): 11.16 seconds  (>13 sec: Falls Risk)  Cognitive TU.78 seconds (Task): counting backwards by 3s from 75 (75-60 correctly)    Balance Assessment: Mini-BEST 20/28 (<23/28)   1) Sit <> Stand: (2) Normal   2) Rise to Toes: (1) Moderate   3) Stand on One Leg: (1) Moderate L: 5.13 seconds R: 10.9 seconds   4) Compensatory Stepping Correction-Forward: (2) Normal   5) Compensatory Stepping Correction-Backward: (2) Normal   6) Compensatory Stepping Correction-Lateral: (2) Normal L: (2) Normal R: (2) Normal   7) Eyes Open, Firm Surface: (2) Normal  >30 seconds  8) Eyes Closed, Foam Surface: (1) Moderate  5.6 seconds  9) Incline, Eyes Closed: (1) Moderate  17 seconds   10) Change in Gait Speed: (2) Normal   11) " Walk with Head Turns-Horizontal: (1) Moderate  12) Walk with Pivot Turns: (1) Moderate 4 steps  13) Step Over Obstacles: (1) Moderate   14) TUG with Dual Tasks: (1) Moderate  TU.16 seconds  Cognitive TU.78 seconds    ABC Scale:   I'm confident I'm not going to lose my balance when:    Walking around the house: 8/10    Climbing stairs: 10/10   Picking up a slipper: 9/10    Reaching for something at eye level: 910    Reaching above my head on my tiptoes: 10/10    Standing on a chair and reaching above: 8/10    Sweeping the floor: 10/10    Walking outside to car in the driveway: 10/10    Getting in/out of a car: 10/10    Walking across the parking lot to the mall: 9.5/10    Walking up and down a ramp 10/10    Walking in a crowded mall with people rapidly walking past: 9.510    I'm bumped into by people at the mall: 9.5/10    Stepping off an escalator and holding onto the rail: 9/10    Stepping off an escalator holding packages: 10    Walking on icy surfaces: 010   TOTAL: 136.5/160 = 85.3%         Carryover Assignment: issued 1A, 1B, 2A, 2B, 2C, 2D to HEP    Assessment/Plan for week:  2017-2017  See discharge summary for details

## 2021-07-20 NOTE — PROGRESS NOTES
Physical Therapy  Physical Therapy  Movement Disorder Treatment Note     MEDICARE    Date: 2/6/2017   Visit #: 8/11 - (12/19/2016  to 3/14/2017) Reassessment completed 1/30/2017  Rx Units: 1- TE, 2- NR, 1- TA  Total OP Minutes: 55    Pain:  No    Subjective:  Pt reports compliance to HEP and notes difficulty with sit to stands from her soft, swivel chair.      Objective:  Therapeutic Exercise:  Total Minutes: 15  Sustained Movements (sitting)  4reps  x 1-2 sets      Exercise   Reps   Sets   Effort     1A Floor to Ceiling Without Flicking   6 1 10     1B Side to Side Without Flicking   6 1 10      Comments: 1A - Good posture this date.  1B - Minor VC for trunk/pelvic rotation with first rep only. Self correction of posture.  Minor cues for alternating sides.     Other:      -NuStep x 5 minutes  Level #5 Ave METS: 2.8   SPM: 95       Neuro-Reeducation/Balance:  Total Minutes: 25  Multidirectional Repetitive Movements.  8-16reps  x 1-2 sets    Step and Reach:   UE support: 2A, 2B, 2C  Chairs Nearby: 2A, 2B      Exercise   Reps   Sets   Effort     2A Forward Step and Reach    12  2 each 10     2B Sideward Step and Reach    12     2 10     2C Backward Step and Reach    12 1 each 10     Comments: 2A - VC for deliberate step height upon return to center.  2B - VC for increased step height upon return to center. Improved amplitude with second set   2C - No alternating. VC for posterior weight shift, BIG toe lift and reach arm back BIG. Decreased amplitude of step upon return to center.       Rock and Reach:  UE support: 2D  Chairs Nearby: 2D      Exercise   Reps   Sets   Effort     2D Left leg Forward/Backward 20 2 10     2D Right leg Forward/Backward 20 2 10     2E Side to Side        Comments: 2D- VC for increased amplitude and power through R UE and BIG posture.       Other:          Gait Training:   Total Minutes:     Time Speed (MPH) UE support Direction Incline Comments                                      Other:      Therapeutic Activity  Total Minutes: 15     Functional Movement  Rep  Sets  Effort    Rolling        Supine to Sit 3 1 9-10    Sit to Stand from standard chair 10 1 10    Sit to Stand from low, soft chair 5 1 10    Sit and Reach       Walk and Turn       PWR Moves:       PWR Moves:        Comments: Supine to sit - VC for increased power and effort.   Sit to stand from standard chair - VC for BIG dive forward and down  Sit to stand from low, soft chair -  VC to scoot to EOC and BIG dive forward. Close SBA for safety    Big Walking: x 900' - VC for BIG posture and upright gaze. Good foot clearance this date.     Other:      Carryover Assignment: issued 1A, 1B, 2A, 2B, 2C, 2D to HEP    Assessment/Plan for week:  2/6/2017-2/10/2017  Patient continues to improve her strength and was able to stand from a low, soft chair without UE support. Her amplitude especially with balance activities and stepping strategies requires additional cues for increased amplitude and effort.  Will continue working on balance activities with a functional component to encourage improved confidence and carryover to everyday activities.  Plan to continue current POC 2x/wk for 3 more sessions.

## 2021-07-20 NOTE — PROGRESS NOTES
Physical Therapy  Physical Therapy  Movement Disorder Treatment Note    Date: 1/9/2017   Visit #: 2/11 - MEDICARE (12/19/2016  to 3/14/2017)  Rx Units: 2- TE, 2- NR  Total OP Minutes: 55    Pain:  No    Subjective:  No new complaints since evaluation.    Objective:  Therapeutic Exercise:  Total Minutes: 30  Sustained Movements (sitting)  4reps  x 1-2 sets      Exercise   Reps   Sets   Effort     1A Floor to Ceiling Without Flicking   4 2 9     1B Side to Side Without Flicking   4 1 each 9.5      Comments: 1A - Shaping for arms during BACK BIG and BIG hands. VC for BIG posture and midline.   1B - no alternating. 50% shaping for trunk rotation and BIG arm. VC for palm up    Other:   -Patient instructed in effort scale in relation to BIG movements. Education also provided on large amplitude (BIG) theory and carry over to everyday tasks.   -NuStep x 8 minutes  Level #5 Ave METS: 2.8  SPM: 92      Neuro-Reeducation/Balance:  Total Minutes: 25  Multidirectional Repetitive Movements.  8-16reps  x 1-2 sets    Step and Reach:   UE support: 2A, 2B  Chairs Nearby: 2A, 2B      Exercise   Reps   Sets   Effort     2A Forward Step and Reach    10 2 each 8     2B Sideward Step and Reach    10     2 6     2C Backward Step and Reach        Comments: 2A - VC for BIG, deliberate step back to center and cues to put weight forward onto stepping leg.   2B - VC for deliberate movements and BIG arms with BIG fingers. VC for BIG posture and loud voice during second set.     Rock and Reach:  UE support:   Chairs Nearby:       Exercise   Reps   Sets   Effort     2D Left leg Forward/Backward          2D Right leg Forward/Backward        2E Side to Side        Comments:   Other:   ABC Scale:   I'm confident I'm not going to lose my balance when:    Walking around the house: 9/10    Climbing stairs: 9/10   Picking up a slipper: 9/10    Reaching for something at eye level: 10/10    Reaching above my head on my tiptoes: 9/10    Standing on a chair  and reaching above: 5/10    Sweeping the floor: 10/10    Walking outside to car in the driveway: 10/10    Getting in/out of a car: 10/10    Walking across the parking lot to the mall: 10/10    Walking up and down a ramp 9/10    Walking in a crowded mall with people rapidly walking past: 9/10    I'm bumped into by people at the mall: 5/10    Stepping off an escalator and holding onto the rail: 5/10    Stepping off an escalator holding packages: 1/10    Walking on icy surfaces: 1/10   TOTAL: 121/160 = 75%     Freezing of Gait Questionnaire  1.  During your worst state--Do you walk:   1  Almost Normally  2.  Are your gait difficulties affecting your daily activities and independence?   0  Not at all   3.  Do you feel that you feet get glued to the floor while walking, making a turn or when trying to initiate walking (freezing)?   0  Never  4.  How long is your longest freezing episode?   0  Never happened  5.  How long is your typical start hesitation episode (freezing when initiating the first step)?   0  None  6.  How long is your typical turning hesitation (freezing when turning)   0  None  Total:          Gait Training:   Total Minutes:     Time Speed (MPH) UE support Direction Incline Comments                                      Other:     Therapeutic Activity  Total Minutes:      Functional Movement  Rep  Sets  Effort    Rolling        Supine to Sit       Sit to Stand from standard chair       Sit to Stand from low, soft chair       Sit and Reach       Walk and Turn       PWR Moves:       PWR Moves:        Comments:     Big Walking:     Other:  PDQ-39   Single Index Score = 20.26%   Dimension:   1. Mobility: 10 % affected   2. Activities of daily livin.16% affected   3. Emotional well-bein.3% affected   4. Stigma: 0% affected   5. Social support: 8.3% affected   6. Cognitive impairment: 31.25% affected   7. Communication: 50% affected   8. Bodily discomfort: 50% affected      Carryover Assignment:  issued 1A, 1B, 2A, 2B to HEP    Assessment/Plan for week:  1/9/2016-1/13/2016  Patient returns for first subsequent treatment session since initial evaluation. Further questionnaires with issued and scored as follows. Patient's balance confidence on the ABC scale is 75% while her PDQ-39 score is 20.26% with communication and bodily discomfort the highest affected. Despite patient's higher rating on the ABC scale, she continues to note decreased confidence in her balance as well as coordination. She was introduced to BIG exercises this date and the effort scale and additional reinforcement is indicated.  Plan to review BIG exercises and initial proximal muscle strengthening next session.

## 2021-07-20 NOTE — PROGRESS NOTES
"Physical Therapy  Physical Therapy  Movement Disorder Treatment Note    MEDICARE    Date: 10/25/2017   Visit #: 9/11 - reassessed on 10/16  Rx Units: 2- TE, 2- NR    Total OP Minutes: 55    Pain:  No       Subjective:  Pt reports that she had an aura type migraine this AM and then she had difficulty reading and putting \"the words together.  I couldn't make sense of what I was reading.\"    Objective:  Therapeutic Exercise:  Total Minutes: 25  Sustained Movements (sitting)  4reps  x 1-2 sets      Exercise   Reps   Sets   Effort     1A Floor to Ceiling Without Flicking    4 1 9     1B Side to Side Without Flicking    4 1 9      Comments: 1A - VC for BIG posture and eyes up.  1B - Initial shaping for trunk rotation and big UE reach.  Pt lacks hip and knee extension despite being on elevated mat.     Other:   -NuStep x 10 minutes,  Workload  #6 Ave METs: 3.0  SPM: 92    -BP:  Seated- 156/77, HR-89bpm (Pt notes her BP was mildly high when she saw her PCP recently, \"In the 140's\")    -BIG walking 2 x 500' with cues for eyes up and to keep feet apart when walking.  Cues to reach for heel strike to encourage improved foot clearance.  Pt was slightly more unsteady this date but no LOB; pt reported feeling more fatigued this date.      Neuro-Reeducation/Balance:  Total Minutes: 30  Multidirectional Repetitive Movements.  8-16reps  x 1-2 sets    Step and Reach:   UE support: 2A  Chairs Nearby: 2A, B      Exercise   Reps   Sets   Effort     2A Forward Step and Reach 12 2 each 9     2B Sideward Step and Reach 12 2 9     2C Backward Step and Reach        Comments: 2A - VC for BIG step back to center and strong arm.  Additional cues for BIG posture and upright gaze  2B - No UE support this date to further challenge pt's balance.  Cues for head turns.  Pt demo'd increased \"dragging\" of L LE back to center; cues for high, deliberate steps    Rock and Reach:  UE support: 2D  Chairs Nearby: 2D      Exercise   Reps   Sets   Effort     " 2D Left leg Forward/Backward   20  2 9     2D Right leg Forward/Backward   20 2 9     2E Side to Side        Comments: 2D - VC for BIG toe lift as well as big heel raise, upright posture and gaze.     Other:      Quick Neuro Screen:     -Bilateral Arm raise above head is symmetrical and ROM is WFL   -Hand squeeze  is strong and no significant change between the two hands   -Lower extremity heel to shin/tibia coordination: intact bilaterally    On blue [employer] foam:   -Step taps to 4# step 2 x 10 reps, alt LE with min-mod A for balance.  Pt had difficulty with wt shifting on the foam.   -Step taps off foam to 3 different targets on the ground and placed to encourage a crossing of midline step, out to the side, and in front of pt.  Completed 15 step taps with ea LE and mod A for balance.  Pt had difficulty stepping off foam and returning back to the foam; greatest difficulty with crossing midline.    Gait Training:   Total Minutes:    Time Speed (MPH) UE support Direction Incline Comments                                            Other:     Therapeutic Activity  Total Minutes:      Functional Movement  Rep  Sets  Effort    Rolling        Supine to Sit       Sit to Stand from standard chair       Sit to Stand from low, soft chair       Sit and Reach       Walk and Turn       PWR Moves:       PWR Moves:        Comments:     Big Walking:     Other:     Carryover Assignment:     Assessment/Plan for week:  10/23/2017-10/27/2017  This author was concerned with pt's self report of a migraine like pain and confusion with reading this AM, coupled with pt's elevated BP this date.  Pt was asymptomatic during the PT session but his author wonders if a TIA event took place this morning.  This author attempted to have a verbal hand off with her daughter to discuss my concerns but the daughter was no present, even 5-10 minutes after the session, so a note was written to the pt's daughter regarding the pt's complaints this AM  along with her BP readings.  Pt said she would tell her daughter that my recommendation is she follow up with her PCP.  The pt has 2 remaining scheduled 1:1 PT visits.

## 2021-07-20 NOTE — PROGRESS NOTES
Physical Therapy  Physical Therapy  Movement Disorder Treatment Note     MEDICARE    Date: 1/30/2017   Visit #: 6/11 - (12/19/2016  to 3/14/2017) Reassessment completed 1/30/2017  Rx Units: 1- TE, 3- NR  Total OP Minutes: 55    Pain:  No    Subjective:  No new complaints. Patient reports compliance to HEP on a daily basis.     Objective:  Therapeutic Exercise:  Total Minutes: 15  Sustained Movements (sitting)  4reps  x 1-2 sets      Exercise   Reps   Sets   Effort     1A Floor to Ceiling Without Flicking   6 1 9     1B Side to Side Without Flicking   6 1 10      Comments: 1A - VC for BIG posture between reps and maintain midline alignment   1B - 25% shaping for trunk/pelvic rotation. VC for palm up and alternating sides    Other:      -NuStep x 6 minutes  Level #4 Ave METS: 2.9   SPM: 101          Neuro-Reeducation/Balance:  Total Minutes: 40  Multidirectional Repetitive Movements.  8-16reps  x 1-2 sets    Step and Reach:   UE support: 2A, 2B, 2C  Chairs Nearby: 2A, 2B      Exercise   Reps   Sets   Effort     2A Forward Step and Reach    10  2 each 10     2B Sideward Step and Reach    10     2 10     2C Backward Step and Reach    10 1 each 10     Comments: 2A - No alternating. Yard stick used for first set only. VC for BIG arm on R.  Improved amplitude with second set and overall on L side.     2B - VC for deliberate step upon return to center and BIG arms as well as head turns. Improved step height with second set.   2C - No alternating. Pt demonstrated minimal foot clearance and too BIG of step on R LE. VC for appropriate step length, BIG toe lift and wider GRACE. Decreased coordination on R side      Rock and Reach:  UE support: 2D  Chairs Nearby: 2D      Exercise   Reps   Sets   Effort     2D Left leg Forward/Backward 20 1 10     2D Right leg Forward/Backward 20 1 10     2E Side to Side        Comments: 2D- Initial VC for appropriate GRACE. VC for BIG posture and effort throughout each set.     Other:  Balance  Assessment: Mini-BEST 18/28 (<23/28)   1) Sit <> Stand: (2) Normal   2) Rise to Toes: (1) Moderate  >3 seconds at submax height   3) Stand on One Leg: (0) Severe L: unable R: unable   4) Compensatory Stepping Correction-Forward: (2) Normal   5) Compensatory Stepping Correction-Backward: (2) Normal   6) Compensatory Stepping Correction-Lateral: (2) Normal L: (2) Normal R: (2) Normal   7) Eyes Open, Firm Surface: (2) Normal >30 seconds   8) Eyes Closed, Foam Surface: (1) Moderate 10 seconds   9) Incline, Eyes Closed: (2) Normal >30 seconds   10) Change in Gait Speed: (1) Moderate    11) Walk with Head Turns-Horizontal: (0) Severe  12) Walk with Pivot Turns: (1) Moderate 4 steps  13) Step Over Obstacles: (1) Moderate   14) TUG with Dual Tasks: (1) Moderate  TU.62 seconds  Cognitive TU.72 seconds         Gait Training:   Total Minutes:     Time Speed (MPH) UE support Direction Incline Comments                                      Other:     Therapeutic Activity  Total Minutes:      Functional Movement  Rep  Sets  Effort    Rolling        Supine to Sit       Sit to Stand from standard chair       Sit to Stand from low, soft chair       Sit and Reach       Walk and Turn       PWR Moves:       PWR Moves:        Comments:     Big Walking:     Other:      Carryover Assignment: issued 1A, 1B, 2A, 2B, 2C to HEP    Assessment/Plan for week:  2017-2/3/2017  Reassessment of mini BEST test was completed this date. Patient has made a great improvement with her balance and scored 18/28 this date compared to  on initial evaluation. She continues to be hesitant with eyes closed activities on various surfaces and her current score is still in the high fall risk range.  Plan to continue working on dynamic balance activities, gait and functional strengthening in order to progress towards goals.

## 2021-07-20 NOTE — PROGRESS NOTES
Physical Therapy  Physical Therapy  Movement Disorder Treatment Note    Date: 10/5/2017   Visit #: 3/11 - MEDICARE  Rx Units: 2 - TE, 2- NR   Total OP Minutes: 55    Pain:  No    Subjective:  Pt reports stiffness this morning however no other complaints.     Objective:  Therapeutic Exercise:  Total Minutes: 30  Sustained Movements (sitting)  4reps  x 1-2 sets      Exercise   Reps   Sets   Effort     1A Floor to Ceiling Without Flicking     4 1 10     1B Side to Side Without Flicking     4 1 10      Comments: 1A - VC for BIG posture and avoid leaning backwards.  Pt able to self correct as reps progressed.   1B - 25% shaping for pelvic rotation and palm up.     Other:   NuStep x 6 minutes Level #4-5 Ave METs: 2.8  SPM: 98  -Side stepping along counter (15') with peach theraband around ankles x 3 reps each direction - VC for increased step height and leg alignment  -Squats x 15 reps x 2 sets with peach theraband above knees - CGA for balance, overall good form  -BIG walking without ' x 2 reps - VC for upright gaze and posture.       Neuro-Reeducation/Balance:  Total Minutes: 25  Multidirectional Repetitive Movements.  8-16reps  x 1-2 sets    Step and Reach:   UE support: 2A, B  Chairs Nearby: 2A, B      Exercise   Reps   Sets   Effort     2A Forward Step and Reach   10 2 each 9     2B Sideward Step and Reach   10 2 9     2C Backward Step and Reach        Comments: 2A - VC for deliberate step back to center and STOMP. Improved amplitude with second set.   2B - VC for increased power and amplitude of steps.  Decreased speed in order to allow for increased amplitude.     Rock and Reach:  UE support:   Chairs Nearby:       Exercise   Reps   Sets   Effort     2D Left leg Forward/Backward          2D Right leg Forward/Backward        2E Side to Side        Comments:     Other:   Balance activities:   Normal stance on yellow foam - close SBA:    Static standing x 2 minutes - VC for upright gaze and posture    Horizontal  "head turns x 10 reps  - no LOB this date    Eyes closed x 15 seconds, 30 seconds - pt did not lose her balance, however appeared to lack confidence. VC for timing provided during second attempt    Standing on blue foam and alternating toe taps to 4\" step:   10 reps with UE support - VC for BIG step onto step   10 reps without support - CGA to [place] for balance. VC for BIG step back onto foam   10 reps with intermittent UE support - VC for BIG step back onto foam          Gait Training:   Total Minutes:     Time Speed (MPH) UE support Direction Incline Comments                                      Other:     Therapeutic Activity  Total Minutes:      Functional Movement  Rep  Sets  Effort    Rolling        Supine to Sit       Sit to Stand from standard chair       Sit to Stand from low, soft chair       Sit and Reach       Walk and Turn       PWR Moves:       PWR Moves:        Comments:     Big Walking:     Other:     Carryover Assignment:     Assessment/Plan for week:  10/2/2017-10/6/2017  See previous note for this week's A/P.      "

## 2021-07-20 NOTE — PROGRESS NOTES
Physical Therapy  Physical Therapy  Movement Disorder Treatment Note    Date: 10/20/2017   Visit #: 7/11 - MEDICARE  Rx Units: 1 - TE, 2- NR  1-Gait  Total OP Minutes: 55    Pain:  No       Subjective:  No complaints this date.  Reports compliance to her HEP.  She does note that her hip has been bothering her when she is walking and the doctor stated it was arthritis.     Objective:  Therapeutic Exercise:  Total Minutes: 15  Sustained Movements (sitting)  4reps  x 1-2 sets      Exercise   Reps   Sets   Effort     1A Floor to Ceiling Without Flicking    6 1 9.5     1B Side to Side Without Flicking    6 1 9      Comments: 1A - VC for BIG posture and avoid resting on back of chair  1B - good posture this date and pt self corrected palm up.  Difficulty with knee extension while on chair height.     Other:   -NuStep x 8 minutes Level #5 Ave METs: 2.7  SPM: 91        Neuro-Reeducation/Balance:  Total Minutes: 25  Multidirectional Repetitive Movements.  8-16reps  x 1-2 sets    Step and Reach:   UE support: 2A, B  Chairs Nearby: 2A, B      Exercise   Reps   Sets   Effort     2A Forward Step and Reach 10 2 each 9     2B Sideward Step and Reach 10 2 9     2C Backward Step and Reach        Comments: 2A - VC for BIG step back to center and strong arm.  Additional cues for BIG posture and upright gaze  2B - VC for increased step height and BIG posture    Rock and Reach:  UE support: 2D  Chairs Nearby: 2D      Exercise   Reps   Sets   Effort     2D Left leg Forward/Backward   20  2 9     2D Right leg Forward/Backward   20 2 9     2E Side to Side        Comments: 2D - VC for BIG toe lift, upright posture and gaze. Instability without UE support    Other:    Dynamic Balance Activities with close SBA:   Walking with speed changes x 60 feet x 2 reps - good speed change   Walking with horizontal head turns x 60 feet x 4 reps - minimal cervical ROM, decreased speed, but no LOB   Walking backwards x 20 feet x 1 reps - CGA for safety and  VC for increased step length   Walking with 180 degree turns x 4 reps - VC for deliberate turn and steps   Divided attention: BIG walking and cognitive task : desserts in alphabetical order A to Z  X 1000' - VC to maintain increased pace.  Pt able to complete cognitive task with less than 10% assistance.           Gait Training:   Total Minutes: 15    Time Speed (MPH) UE support Direction Incline Comments    3:00 2.0 B forward 0% VC for increased foot clearance on L    2:00 2.0 B forward 3% Intermittent VC for L foot clearance   2:00 2.0 B forward 0% VC for increased L foot clearance and BIG posture   3:00 2.0 B Forward 4% Improved foot clearance on L   1:00 2.0 B forward 0% VC for upright posture    Other:     Therapeutic Activity  Total Minutes:      Functional Movement  Rep  Sets  Effort    Rolling        Supine to Sit       Sit to Stand from standard chair       Sit to Stand from low, soft chair       Sit and Reach       Walk and Turn       PWR Moves:       PWR Moves:        Comments:     Big Walking:     Other:     Carryover Assignment:     Assessment/Plan for week:  10/16/2017-10/20/2017  See previous note for this week's A/P.